# Patient Record
Sex: MALE | Race: AMERICAN INDIAN OR ALASKA NATIVE | NOT HISPANIC OR LATINO | Employment: UNEMPLOYED | URBAN - METROPOLITAN AREA
[De-identification: names, ages, dates, MRNs, and addresses within clinical notes are randomized per-mention and may not be internally consistent; named-entity substitution may affect disease eponyms.]

---

## 2021-09-02 ENCOUNTER — HOSPITAL ENCOUNTER (EMERGENCY)
Facility: HOSPITAL | Age: 51
Discharge: HOME | End: 2021-09-02
Attending: SURGERY

## 2021-09-02 DIAGNOSIS — L03.115 CELLULITIS OF RIGHT LOWER EXTREMITY: Primary | ICD-10-CM

## 2021-09-02 PROCEDURE — 99281 EMR DPT VST MAYX REQ PHY/QHP: CPT

## 2024-01-19 ENCOUNTER — APPOINTMENT (OUTPATIENT)
Dept: RADIOLOGY | Facility: CLINIC | Age: 54
End: 2024-01-19
Attending: STUDENT IN AN ORGANIZED HEALTH CARE EDUCATION/TRAINING PROGRAM
Payer: MEDICAID

## 2024-01-19 ENCOUNTER — OFFICE VISIT (OUTPATIENT)
Dept: FAMILY MEDICINE | Facility: CLINIC | Age: 54
End: 2024-01-19
Payer: MEDICAID

## 2024-01-19 VITALS
DIASTOLIC BLOOD PRESSURE: 64 MMHG | BODY MASS INDEX: 29.71 KG/M2 | RESPIRATION RATE: 16 BRPM | HEIGHT: 74 IN | HEART RATE: 80 BPM | OXYGEN SATURATION: 97 % | SYSTOLIC BLOOD PRESSURE: 122 MMHG | WEIGHT: 231.5 LBS

## 2024-01-19 DIAGNOSIS — Z11.59 ENCOUNTER FOR HEPATITIS C SCREENING TEST FOR LOW RISK PATIENT: ICD-10-CM

## 2024-01-19 DIAGNOSIS — G89.29 CHRONIC PAIN OF LEFT KNEE: ICD-10-CM

## 2024-01-19 DIAGNOSIS — Z13.1 DIABETES MELLITUS SCREENING: ICD-10-CM

## 2024-01-19 DIAGNOSIS — Z13.6 ENCOUNTER FOR LIPID SCREENING FOR CARDIOVASCULAR DISEASE: ICD-10-CM

## 2024-01-19 DIAGNOSIS — Z23 NEED FOR DIPHTHERIA-TETANUS-PERTUSSIS (TDAP) VACCINE: ICD-10-CM

## 2024-01-19 DIAGNOSIS — M25.562 CHRONIC PAIN OF LEFT KNEE: ICD-10-CM

## 2024-01-19 DIAGNOSIS — Z76.89 ENCOUNTER TO ESTABLISH CARE: Primary | ICD-10-CM

## 2024-01-19 DIAGNOSIS — Z12.5 SCREENING PSA (PROSTATE SPECIFIC ANTIGEN): ICD-10-CM

## 2024-01-19 DIAGNOSIS — Z12.11 COLON CANCER SCREENING: ICD-10-CM

## 2024-01-19 DIAGNOSIS — Z80.0 FAMILY HISTORY OF COLON CANCER IN MOTHER: ICD-10-CM

## 2024-01-19 DIAGNOSIS — E66.3 OVERWEIGHT (BMI 25.0-29.9): ICD-10-CM

## 2024-01-19 DIAGNOSIS — Z13.220 ENCOUNTER FOR LIPID SCREENING FOR CARDIOVASCULAR DISEASE: ICD-10-CM

## 2024-01-19 DIAGNOSIS — Z13.29 THYROID DISORDER SCREEN: ICD-10-CM

## 2024-01-19 DIAGNOSIS — Z00.00 ENCOUNTER FOR SCREENING AND PREVENTATIVE CARE: ICD-10-CM

## 2024-01-19 PROCEDURE — 3008F BODY MASS INDEX DOCD: CPT | Mod: CPTII,,, | Performed by: STUDENT IN AN ORGANIZED HEALTH CARE EDUCATION/TRAINING PROGRAM

## 2024-01-19 PROCEDURE — 73560 X-RAY EXAM OF KNEE 1 OR 2: CPT | Mod: TC,PO,LT

## 2024-01-19 PROCEDURE — 1159F MED LIST DOCD IN RCRD: CPT | Mod: CPTII,,, | Performed by: STUDENT IN AN ORGANIZED HEALTH CARE EDUCATION/TRAINING PROGRAM

## 2024-01-19 PROCEDURE — 1160F RVW MEDS BY RX/DR IN RCRD: CPT | Mod: CPTII,,, | Performed by: STUDENT IN AN ORGANIZED HEALTH CARE EDUCATION/TRAINING PROGRAM

## 2024-01-19 PROCEDURE — 3078F DIAST BP <80 MM HG: CPT | Mod: CPTII,,, | Performed by: STUDENT IN AN ORGANIZED HEALTH CARE EDUCATION/TRAINING PROGRAM

## 2024-01-19 PROCEDURE — 3074F SYST BP LT 130 MM HG: CPT | Mod: CPTII,,, | Performed by: STUDENT IN AN ORGANIZED HEALTH CARE EDUCATION/TRAINING PROGRAM

## 2024-01-19 PROCEDURE — 99999 PR PBB SHADOW E&M-EST. PATIENT-LVL III: CPT | Mod: PBBFAC,,, | Performed by: STUDENT IN AN ORGANIZED HEALTH CARE EDUCATION/TRAINING PROGRAM

## 2024-01-19 PROCEDURE — 90715 TDAP VACCINE 7 YRS/> IM: CPT | Mod: PBBFAC | Performed by: STUDENT IN AN ORGANIZED HEALTH CARE EDUCATION/TRAINING PROGRAM

## 2024-01-19 PROCEDURE — 99213 OFFICE O/P EST LOW 20 MIN: CPT | Mod: PBBFAC | Performed by: STUDENT IN AN ORGANIZED HEALTH CARE EDUCATION/TRAINING PROGRAM

## 2024-01-19 PROCEDURE — 99205 OFFICE O/P NEW HI 60 MIN: CPT | Mod: S$PBB,25,, | Performed by: STUDENT IN AN ORGANIZED HEALTH CARE EDUCATION/TRAINING PROGRAM

## 2024-01-19 PROCEDURE — 90471 IMMUNIZATION ADMIN: CPT | Mod: ,,, | Performed by: STUDENT IN AN ORGANIZED HEALTH CARE EDUCATION/TRAINING PROGRAM

## 2024-01-19 PROCEDURE — 99999PBSHW TDAP VACCINE GREATER THAN OR EQUAL TO 7YO IM: Mod: PBBFAC,,,

## 2024-01-19 PROCEDURE — 90715 TDAP VACCINE 7 YRS/> IM: CPT | Mod: ,,, | Performed by: STUDENT IN AN ORGANIZED HEALTH CARE EDUCATION/TRAINING PROGRAM

## 2024-01-19 PROCEDURE — 73560 X-RAY EXAM OF KNEE 1 OR 2: CPT | Mod: 26,LT,, | Performed by: RADIOLOGY

## 2024-01-19 RX ORDER — DICLOFENAC SODIUM 75 MG/1
75 TABLET, DELAYED RELEASE ORAL 2 TIMES DAILY PRN
Qty: 60 TABLET | Refills: 2 | Status: SHIPPED | OUTPATIENT
Start: 2024-01-19 | End: 2024-04-16

## 2024-01-22 ENCOUNTER — CLINICAL SUPPORT (OUTPATIENT)
Dept: FAMILY MEDICINE | Facility: CLINIC | Age: 54
End: 2024-01-22
Payer: MEDICAID

## 2024-01-22 DIAGNOSIS — Z13.6 ENCOUNTER FOR LIPID SCREENING FOR CARDIOVASCULAR DISEASE: ICD-10-CM

## 2024-01-22 DIAGNOSIS — Z13.29 THYROID DISORDER SCREEN: ICD-10-CM

## 2024-01-22 DIAGNOSIS — Z13.220 ENCOUNTER FOR LIPID SCREENING FOR CARDIOVASCULAR DISEASE: ICD-10-CM

## 2024-01-22 DIAGNOSIS — Z12.5 SCREENING PSA (PROSTATE SPECIFIC ANTIGEN): ICD-10-CM

## 2024-01-22 DIAGNOSIS — Z00.00 ENCOUNTER FOR SCREENING AND PREVENTATIVE CARE: ICD-10-CM

## 2024-01-22 DIAGNOSIS — Z11.59 ENCOUNTER FOR HEPATITIS C SCREENING TEST FOR LOW RISK PATIENT: ICD-10-CM

## 2024-01-22 DIAGNOSIS — Z13.1 DIABETES MELLITUS SCREENING: ICD-10-CM

## 2024-01-22 LAB
ALBUMIN SERPL BCP-MCNC: 3.9 G/DL (ref 3.5–5.2)
ALP SERPL-CCNC: 52 U/L (ref 55–135)
ALT SERPL W/O P-5'-P-CCNC: 15 U/L (ref 10–44)
ANION GAP SERPL CALC-SCNC: 7 MMOL/L (ref 8–16)
AST SERPL-CCNC: 15 U/L (ref 10–40)
BASOPHILS # BLD AUTO: 0.04 K/UL (ref 0–0.2)
BASOPHILS NFR BLD: 0.6 % (ref 0–1.9)
BILIRUB SERPL-MCNC: 1.5 MG/DL (ref 0.1–1)
BUN SERPL-MCNC: 16 MG/DL (ref 6–20)
CALCIUM SERPL-MCNC: 9.2 MG/DL (ref 8.7–10.5)
CHLORIDE SERPL-SCNC: 109 MMOL/L (ref 95–110)
CHOLEST SERPL-MCNC: 127 MG/DL (ref 120–199)
CHOLEST/HDLC SERPL: 2.7 {RATIO} (ref 2–5)
CO2 SERPL-SCNC: 27 MMOL/L (ref 23–29)
COMPLEXED PSA SERPL-MCNC: 0.14 NG/ML (ref 0–4)
CREAT SERPL-MCNC: 0.9 MG/DL (ref 0.5–1.4)
DIFFERENTIAL METHOD BLD: ABNORMAL
EOSINOPHIL # BLD AUTO: 0.9 K/UL (ref 0–0.5)
EOSINOPHIL NFR BLD: 13.7 % (ref 0–8)
ERYTHROCYTE [DISTWIDTH] IN BLOOD BY AUTOMATED COUNT: 12.9 % (ref 11.5–14.5)
EST. GFR  (NO RACE VARIABLE): >60 ML/MIN/1.73 M^2
ESTIMATED AVG GLUCOSE: 108 MG/DL (ref 68–131)
GLUCOSE SERPL-MCNC: 119 MG/DL (ref 70–110)
HBA1C MFR BLD: 5.4 % (ref 4–5.6)
HCT VFR BLD AUTO: 45.2 % (ref 40–54)
HDLC SERPL-MCNC: 47 MG/DL (ref 40–75)
HDLC SERPL: 37 % (ref 20–50)
HGB BLD-MCNC: 14.6 G/DL (ref 14–18)
IMM GRANULOCYTES # BLD AUTO: 0.01 K/UL (ref 0–0.04)
IMM GRANULOCYTES NFR BLD AUTO: 0.2 % (ref 0–0.5)
LDLC SERPL CALC-MCNC: 72.2 MG/DL (ref 63–159)
LYMPHOCYTES # BLD AUTO: 1.5 K/UL (ref 1–4.8)
LYMPHOCYTES NFR BLD: 23.3 % (ref 18–48)
MCH RBC QN AUTO: 29.3 PG (ref 27–31)
MCHC RBC AUTO-ENTMCNC: 32.3 G/DL (ref 32–36)
MCV RBC AUTO: 91 FL (ref 82–98)
MONOCYTES # BLD AUTO: 0.4 K/UL (ref 0.3–1)
MONOCYTES NFR BLD: 6.7 % (ref 4–15)
NEUTROPHILS # BLD AUTO: 3.6 K/UL (ref 1.8–7.7)
NEUTROPHILS NFR BLD: 55.5 % (ref 38–73)
NONHDLC SERPL-MCNC: 80 MG/DL
NRBC BLD-RTO: 0 /100 WBC
PLATELET # BLD AUTO: 187 K/UL (ref 150–450)
PMV BLD AUTO: 12.5 FL (ref 9.2–12.9)
POTASSIUM SERPL-SCNC: 4.4 MMOL/L (ref 3.5–5.1)
PROT SERPL-MCNC: 6.8 G/DL (ref 6–8.4)
RBC # BLD AUTO: 4.99 M/UL (ref 4.6–6.2)
SODIUM SERPL-SCNC: 143 MMOL/L (ref 136–145)
TRIGL SERPL-MCNC: 39 MG/DL (ref 30–150)
TSH SERPL DL<=0.005 MIU/L-ACNC: 0.67 UIU/ML (ref 0.4–4)
WBC # BLD AUTO: 6.56 K/UL (ref 3.9–12.7)

## 2024-01-22 PROCEDURE — 99999PBSHW PR PBB SHADOW TECHNICAL ONLY FILED TO HB: Mod: PBBFAC,,,

## 2024-01-22 PROCEDURE — 80053 COMPREHEN METABOLIC PANEL: CPT | Performed by: STUDENT IN AN ORGANIZED HEALTH CARE EDUCATION/TRAINING PROGRAM

## 2024-01-22 PROCEDURE — 86803 HEPATITIS C AB TEST: CPT | Performed by: STUDENT IN AN ORGANIZED HEALTH CARE EDUCATION/TRAINING PROGRAM

## 2024-01-22 PROCEDURE — 85025 COMPLETE CBC W/AUTO DIFF WBC: CPT | Performed by: STUDENT IN AN ORGANIZED HEALTH CARE EDUCATION/TRAINING PROGRAM

## 2024-01-22 PROCEDURE — 36415 COLL VENOUS BLD VENIPUNCTURE: CPT | Mod: PBBFAC

## 2024-01-22 PROCEDURE — 83036 HEMOGLOBIN GLYCOSYLATED A1C: CPT | Performed by: STUDENT IN AN ORGANIZED HEALTH CARE EDUCATION/TRAINING PROGRAM

## 2024-01-22 PROCEDURE — 84443 ASSAY THYROID STIM HORMONE: CPT | Performed by: STUDENT IN AN ORGANIZED HEALTH CARE EDUCATION/TRAINING PROGRAM

## 2024-01-22 PROCEDURE — 84153 ASSAY OF PSA TOTAL: CPT | Performed by: STUDENT IN AN ORGANIZED HEALTH CARE EDUCATION/TRAINING PROGRAM

## 2024-01-22 PROCEDURE — 80061 LIPID PANEL: CPT | Performed by: STUDENT IN AN ORGANIZED HEALTH CARE EDUCATION/TRAINING PROGRAM

## 2024-01-23 ENCOUNTER — CLINICAL SUPPORT (OUTPATIENT)
Dept: REHABILITATION | Facility: HOSPITAL | Age: 54
End: 2024-01-23
Attending: STUDENT IN AN ORGANIZED HEALTH CARE EDUCATION/TRAINING PROGRAM
Payer: MEDICAID

## 2024-01-23 DIAGNOSIS — M25.562 CHRONIC PAIN OF LEFT KNEE: ICD-10-CM

## 2024-01-23 DIAGNOSIS — G89.29 CHRONIC PAIN OF LEFT KNEE: ICD-10-CM

## 2024-01-23 LAB — HCV AB SERPL QL IA: NORMAL

## 2024-01-23 PROCEDURE — 97110 THERAPEUTIC EXERCISES: CPT | Mod: PN

## 2024-01-23 PROCEDURE — 97161 PT EVAL LOW COMPLEX 20 MIN: CPT | Mod: PN

## 2024-01-23 NOTE — PLAN OF CARE
[facility]   Physical Therapy Initial Evaluation     Date: 1/23/2024   Name: Gera Jha  Clinic Number: [id number]    Therapy Diagnosis:   Encounter Diagnosis   Name Primary?    Chronic pain of left knee      Physician: Philip Lewis MD    Physician Orders: PT Eval and Treat   Medical Diagnosis from Referral:   Diagnosis   M25.562,G89.29 (ICD-10-CM) - Chronic pain of left knee     Evaluation Date: 1/23/2024  Authorization Period Expiration: 1/18/2025  Plan of Care Expiration: 2/20/2024  Progress Note Due: 2/6/2024  Visit # / Visits authorized: 1/ 1   FOTO: 1/3    Precautions: Standard     Time In: 1030  Time Out: 1110  Total Appointment Time (timed & untimed codes): 40 minutes      SUBJECTIVE     Date of onset: chronic pain    History of current condition - Gera reports: he worked heavy labor jobs including construction and . Patient reports that he had a fall in 2004 which started his pain in his left knee. Patient does not work at this time secondary to pain and inability to perform jobs.    Falls: 2004    Imaging, bone scan films:   FINDINGS:  Tricompartmental degenerative changes, most pronounced in the medial patellofemoral compartment, with joint space narrowing, subchondral sclerosis and marginal osteophytosis.  There is dorsal spurring from the patella.  Suspected loose body within the anterior joint space measuring 1.7 cm.  Small joint effusion.No fracture or dislocation.     Impression:     As above.       Prior Therapy: none  Social History:  lives alone  Occupation: unemployed  Prior Level of Function: Independent   Current Level of Function: independent with difficulty    Pain:  Current 2/10, worst 8/10, best 0/10   Location: left knee    Description: Sharp and Shooting  Aggravating Factors: Walking  Easing Factors: heating pad    Patients goals: Patient to return to prior level of function with decreased pain.      Medical History:   No past medical  history on file.    Surgical History:   Gera Jha  has no past surgical history on file.    Medications:   Gera has a current medication list which includes the following prescription(s): diclofenac.    Allergies:   Review of patient's allergies indicates:   Allergen Reactions    First aid ointment w/pramoxin [llypo-vjaci-rhsgyfr-pramoxine]     Penicillins         OBJECTIVE     LOWER EXTREMITY AROM    LEFT RIGHT   Knee Flexion 110 WNL   Knee Extension -10 WNL   LOWER EXTREMITY PROM   Knee Flexion 115 WNL   Knee Extension -8 WNL       LOWER EXTREMITY STRENGTH    LEFT RIGHT   Knee Extension 4/5 4+/5   Knee Flexion 4-/5 4/5   Hip Flexion 4/5 4/5   Hip Abduction 4-/5 4-/5   Hip Extension 4/5 4/5     FLEXIBILITY   Hamstring  Mild restriction       DERMATOMES: Sensation: Light Touch: Intact  MYOTOMES: normal    SPECIAL TESTS    LEFT RIGHT   Obers -    Parveen +    Thessaly -    Jointline Tenderness -    Hip Scour -    Hip BG -      PALPATION:  Patient reports primary pain region along peripatella region    SQUAT: The pt demonstrates bilateral knee valgus with anterior tibial translation noted.     PATELLAR TRACKING: hypomobility in patella in all directions       Limitation/Restriction for FOTO Knee Survey    Therapist reviewed FOTO scores for Gera Jha on 1/23/2024.   FOTO documents entered into [employer] - see Media section.    Limitation Score: 50%       TREATMENT     Total Treatment time (time-based codes) separate from Evaluation: 20 minutes      Gera received the treatments listed below:      therapeutic exercises to develop strength and ROM for 20 minutes including:  2 x 10 short arc quad  2 x 10 clam shells with red theraband   2 x 10 long arc quad 3#  2 x 10 hamstring curls with red theraband     manual therapy techniques: Joint mobilizations were applied to the: left knee for 0 minutes, including:      neuromuscular re-education activities to improve:  for  minutes. The following activities were  included:      therapeutic activities to improve functional performance for  10 minutes, includin' recumbent bike  2 x 10 step ups on 4 in step  5 x 10 seconds gastroc stretch    gait training to improve functional mobility and safety for   minutes, including:      direct contact modalities after being cleared for contraindications:     supervised modalities after being cleared for contradictions:     hot pack for  minutes to .    cold pack for  minutes to .      PATIENT EDUCATION AND HOME EXERCISES     Education provided:   - Patient to work on ROM in left knee   - Pt/family was provided educational information, including: role of PT, goals for PT, scheduling - pt verbalized understanding. Discussed insurance limitations with pt.     Written Home Exercises Provided: yes. Exercises were reviewed and Gera was able to demonstrate them prior to the end of the session.  Gera demonstrated good  understanding of the education provided. See EMR under Patient Instructions for exercises provided during therapy sessions.    ASSESSMENT     Gera is a 53 y.o. male referred to outpatient Physical Therapy with a medical diagnosis of   Diagnosis   M25.562,G89.29 (ICD-10-CM) - Chronic pain of left knee   . Patient presents with decreased ROM in left knee, left knee strength, flexibility and endurance. Patient would benefit from skilled physical therapy to address the above mentioned deficits.    -Decreased function (LEFS)  -Increased pain (NPS)   -Decreased pain free knee AROM  -Decreased LE strength (MMT)   -Decreased LE mobility   -Decreased participation in regular exercise routine  -(+) TTP    Intervention strategies designed to address these impairments will be instrumental in achieving the stated functional goals, including her ability to safely perform mobility tasks. Based on the examination, the patient's rehabilitation potential to achieve functional goals is good.    Patient prognosis is Good.   Patient will benefit  from skilled outpatient Physical Therapy to address the deficits stated above and in the chart below, provide patient /family education, and to maximize patientt's level of independence.     Plan of care discussed with patient: Yes  Patient's spiritual, cultural and educational needs considered and patient is agreeable to the plan of care and goals as stated below:     Anticipated Barriers for therapy: none    Medical Necessity is demonstrated by the following  History  Co-morbidities and personal factors that may impact the plan of care Co-morbidities:   none    Personal Factors:   no deficits     low   Examination  Body Structures and Functions, activity limitations and participation restrictions that may impact the plan of care Body Regions:   lower extremities    Body Systems:    ROM  strength    Participation Restrictions:   none         low   Clinical Presentation stable and uncomplicated low   Decision Making/ Complexity Score: low     Goals:  Short Term Goals: 2 weeks   1. Patient demonstrates independence with HEP.   2. Patient to improve left knee extension by 5 degrees.    Long Term Goals: 4 weeks   1. Patient demonstrates increased ROM to WNL to improve tolerance to functional activities.   2. Patient demonstrates increased strength BLE's to 4+/5 or greater to improve tolerance to functional activities.   3. Patient demonstrates improved overall function per KOOS to 59% or less.     PLAN   Plan of care Certification: 1/23/2024 to 2/20/2024.    Outpatient Physical Therapy 2 times weekly for 4 weeks to include the following interventions: Electrical Stimulation  , Manual Therapy, Neuromuscular Re-ed, Patient Education, Therapeutic Activities, and Therapeutic Exercise.     Suzy Conway, PT    Pt may be seen by PTA as part of the rehabilitation team.     Therapist: Suzy Conway, PT  1/23/2024    I CERTIFY THE NEED FOR THESE SERVICES FURNISHED UNDER THIS PLAN OF TREATMENT AND WHILE UNDER MY CARE   Physician's  comments:     Physician's Signature: ___________________________________________________

## 2024-01-26 ENCOUNTER — CLINICAL SUPPORT (OUTPATIENT)
Dept: REHABILITATION | Facility: HOSPITAL | Age: 54
End: 2024-01-26
Attending: STUDENT IN AN ORGANIZED HEALTH CARE EDUCATION/TRAINING PROGRAM
Payer: MEDICAID

## 2024-01-26 DIAGNOSIS — M25.562 CHRONIC PAIN OF LEFT KNEE: Primary | ICD-10-CM

## 2024-01-26 DIAGNOSIS — G89.29 CHRONIC PAIN OF LEFT KNEE: Primary | ICD-10-CM

## 2024-01-26 PROCEDURE — 97110 THERAPEUTIC EXERCISES: CPT | Mod: PN

## 2024-01-26 NOTE — PROGRESS NOTES
[facility]   Physical Therapy Treatment Note      Name: Gera Jha  Clinic Number: [id number]    Therapy Diagnosis:   Encounter Diagnosis   Name Primary?    Chronic pain of left knee Yes     Physician: Philip Lewis MD    Visit Date: 2024    Evaluation Date: 2024  Authorization Period Expiration: 2025  Plan of Care Expiration: 2024  Progress Note Due: 2024  Visit # / Visits authorized:    FOTO: 1/3     Precautions: Standard      Time In: 1030  Time Out: 1110  Total Appointment Time (timed & untimed codes): 40 minutes    PTA Visit #: -/5       Subjective     Patient reports: that he was a little sore from his first visit.  He was compliant with home exercise program.  Response to previous treatment: positive  Functional change: none    Pain: 4/10  Location: left knee      Objective      Objective Measures updated at progress report unless specified.     Treatment     Gera received the treatments listed below:      therapeutic exercises to develop strength, endurance, and ROM for 25 minutes including:  2 x 10 short arc quad  2 x 10 clam shells with red theraband   2 x 10 long arc quad 3#  2 x 10 hamstring curls with red theraband   3 x 10 shuttle squats with 37 lbs  3 x 10 heel raises 37#  manual therapy techniques:  were applied to the:  for  minutes, including:      neuromuscular re-education activities to improve:  for  minutes. The following activities were included:      therapeutic activities to improve functional performance for   minutes, includin' recumbent bike  2 x 10 step ups on 4 in step  5 x 10 seconds gastroc stretch    gait training to improve functional mobility and safety for   minutes, including:      direct contact modalities after being cleared for contraindications:     supervised modalities after being cleared for contradictions:     hot pack for  minutes to .    cold pack for 5 minutes to left knee.    Patient Education and  Home Exercises       Education provided:   - none    Written Home Exercises Provided: Patient instructed to cont prior HEP. Exercises were reviewed and Gera was able to demonstrate them prior to the end of the session.  Gera demonstrated good  understanding of the education provided. See Electronic Medical Record under Patient Instructions for exercises provided during therapy sessions    Assessment     Patient tolerated today's treatment well with no increased reports of pain or discomfort. Patient would benefit from continued flexibility and improve ROM in left knee.    Gera Is progressing well towards his goals.   Patient prognosis is Good.     Patient will continue to benefit from skilled outpatient physical therapy to address the deficits listed in the problem list box on initial evaluation, provide pt/family education and to maximize pt's level of independence in the home and community environment.     Patient's spiritual, cultural and educational needs considered and pt agreeable to plan of care and goals.     Anticipated barriers to physical therapy: none    Goals: Short Term Goals: 2 weeks   1. Patient demonstrates independence with HEP.   2. Patient to improve left knee extension by 5 degrees.     Long Term Goals: 4 weeks   1. Patient demonstrates increased ROM to WNL to improve tolerance to functional activities.   2. Patient demonstrates increased strength BLE's to 4+/5 or greater to improve tolerance to functional activities.   3. Patient demonstrates improved overall function per KOOS to 59% or less.     Plan     Plan of care Certification: 1/23/2024 to 2/20/2024.     Outpatient Physical Therapy 2 times weekly for 4 weeks to include the following interventions: Electrical Stimulation  , Manual Therapy, Neuromuscular Re-ed, Patient Education, Therapeutic Activities, and Therapeutic Exercise.      Pt may be seen by PTA as part of the rehabilitation team.        Suzy Conway, PT

## 2024-01-30 ENCOUNTER — CLINICAL SUPPORT (OUTPATIENT)
Dept: REHABILITATION | Facility: HOSPITAL | Age: 54
End: 2024-01-30
Attending: STUDENT IN AN ORGANIZED HEALTH CARE EDUCATION/TRAINING PROGRAM
Payer: MEDICAID

## 2024-01-30 DIAGNOSIS — M25.562 CHRONIC PAIN OF LEFT KNEE: Primary | ICD-10-CM

## 2024-01-30 DIAGNOSIS — G89.29 CHRONIC PAIN OF LEFT KNEE: Primary | ICD-10-CM

## 2024-01-30 PROCEDURE — 97110 THERAPEUTIC EXERCISES: CPT | Mod: PN

## 2024-01-30 NOTE — PROGRESS NOTES
[facility]   Physical Therapy Treatment Note      Name: Gera Jha  Clinic Number: [id number]    Therapy Diagnosis:   Encounter Diagnosis   Name Primary?    Chronic pain of left knee Yes     Physician: Philip Lewis MD    Visit Date: 2024    Evaluation Date: 2024  Authorization Period Expiration: 2025  Plan of Care Expiration: 2024  Progress Note Due: 2024  Visit # / Visits authorized:   FOTO: 1/3     Precautions: Standard      Time In: 1030  Time Out: 1110  Total Appointment Time (timed & untimed codes): 40 minutes    PTA Visit #: -/5       Subjective     Patient reports: that he was a little sore from his first visit.  He was compliant with home exercise program.  Response to previous treatment: positive  Functional change: none    Pain: 4/10  Location: left knee      Objective      Objective Measures updated at progress report unless specified.     Treatment     Gera received the treatments listed below:      therapeutic exercises to develop strength, endurance, and ROM for 25 minutes including:  2 x 10 short arc quad  2 x 10 clam shells with red theraband   2 x 10 long arc quad 3#  2 x 10 hamstring curls with red theraband   3 x 10 shuttle squats with 37 lbs  3 x 10 heel raises 37#  2 x 10 red theraband extension and abduction  2 x 10 terminal knee extension with red theraband   3 x 30 seconds balance on blue airex  manual therapy techniques:  were applied to the:  for  minutes, including:      neuromuscular re-education activities to improve:  for  minutes. The following activities were included:      therapeutic activities to improve functional performance for   minutes, includin' recumbent bike  2 x 10 step ups on 4 in step  5 x 10 seconds gastroc stretch    gait training to improve functional mobility and safety for   minutes, including:      direct contact modalities after being cleared for contraindications:     supervised modalities  after being cleared for contradictions:     hot pack for  minutes to .    cold pack for 5 minutes to left knee.    Patient Education and Home Exercises       Education provided:   - none    Written Home Exercises Provided: Patient instructed to cont prior HEP. Exercises were reviewed and Gera was able to demonstrate them prior to the end of the session.  Egra demonstrated good  understanding of the education provided. See Electronic Medical Record under Patient Instructions for exercises provided during therapy sessions    Assessment     Patient tolerated today's treatment well with no increased reports of pain or discomfort. Patient would benefit from continued flexibility and improve ROM in left knee.    Gera Is progressing well towards his goals.   Patient prognosis is Good.     Patient will continue to benefit from skilled outpatient physical therapy to address the deficits listed in the problem list box on initial evaluation, provide pt/family education and to maximize pt's level of independence in the home and community environment.     Patient's spiritual, cultural and educational needs considered and pt agreeable to plan of care and goals.     Anticipated barriers to physical therapy: none    Goals: Short Term Goals: 2 weeks   1. Patient demonstrates independence with HEP.   2. Patient to improve left knee extension by 5 degrees.     Long Term Goals: 4 weeks   1. Patient demonstrates increased ROM to WNL to improve tolerance to functional activities.   2. Patient demonstrates increased strength BLE's to 4+/5 or greater to improve tolerance to functional activities.   3. Patient demonstrates improved overall function per KOOS to 59% or less.     Plan     Plan of care Certification: 1/23/2024 to 2/20/2024.     Outpatient Physical Therapy 2 times weekly for 4 weeks to include the following interventions: Electrical Stimulation  , Manual Therapy, Neuromuscular Re-ed, Patient Education, Therapeutic Activities,  and Therapeutic Exercise.      Pt may be seen by PTA as part of the rehabilitation team.        Suzy Conway, PT

## 2024-02-01 ENCOUNTER — CLINICAL SUPPORT (OUTPATIENT)
Dept: REHABILITATION | Facility: HOSPITAL | Age: 54
End: 2024-02-01
Attending: STUDENT IN AN ORGANIZED HEALTH CARE EDUCATION/TRAINING PROGRAM
Payer: MEDICAID

## 2024-02-01 DIAGNOSIS — G89.29 CHRONIC PAIN OF LEFT KNEE: Primary | ICD-10-CM

## 2024-02-01 DIAGNOSIS — M25.562 CHRONIC PAIN OF LEFT KNEE: Primary | ICD-10-CM

## 2024-02-01 PROCEDURE — 97110 THERAPEUTIC EXERCISES: CPT | Mod: PN

## 2024-02-01 NOTE — PROGRESS NOTES
[facility]   Physical Therapy Treatment Note      Name: Gera Jha  Clinic Number: [id number]    Therapy Diagnosis:   Encounter Diagnosis   Name Primary?    Chronic pain of left knee Yes     Physician: Philip Lewis MD    Visit Date: 2024    Evaluation Date: 2024  Authorization Period Expiration: 2025  Plan of Care Expiration: 2024  Progress Note Due: 2024  Visit # / Visits authorized:   FOTO: 1/3     Precautions: Standard      Time In: 1100  Time Out: 1140  Total Appointment Time (timed & untimed codes): 40 minutes    PTA Visit #: -/5       Subjective     Patient reports: that he has been feeling really good. No new c/o.   He was compliant with home exercise program.  Response to previous treatment: positive  Functional change: none    Pain: 4/10  Location: left knee      Objective      Objective Measures updated at progress report unless specified.     Treatment     Gera received the treatments listed below:      therapeutic exercises to develop strength, endurance, and ROM for 25 minutes including:    3 x 10 shuttle squats with 42 lbs  3 x 10 heel raises 42#  2 x 10 red theraband extension and abduction  2 x 10 terminal knee extension with red theraband   3 x 30 seconds balance on blue airex  2' sidestepping with green theraband     Not performed:  2 x 10 short arc quad  2 x 10 clam shells with red theraband   2 x 10 long arc quad 3#  2 x 10 hamstring curls with red theraband   manual therapy techniques:  were applied to the:  for  minutes, including:      neuromuscular re-education activities to improve:  for  minutes. The following activities were included:      therapeutic activities to improve functional performance for   minutes, includin' recumbent bike Level 4  2 x 10 step ups on 4 in step  2 x 10 lateral step ups 4 in step  5 x 10 seconds gastroc stretch    gait training to improve functional mobility and safety for   minutes,  including:      direct contact modalities after being cleared for contraindications:     supervised modalities after being cleared for contradictions:     hot pack for  minutes to .    cold pack for 0 minutes to left knee.    Patient Education and Home Exercises       Education provided:   - none    Written Home Exercises Provided: Patient instructed to cont prior HEP. Exercises were reviewed and Gera was able to demonstrate them prior to the end of the session.  Gera demonstrated good  understanding of the education provided. See Electronic Medical Record under Patient Instructions for exercises provided during therapy sessions    Assessment     Patient tolerated today's treatment well with no increased reports of pain or discomfort. Patient would benefit from continued flexibility and improve ROM in left knee.    Gera Is progressing well towards his goals.   Patient prognosis is Good.     Patient will continue to benefit from skilled outpatient physical therapy to address the deficits listed in the problem list box on initial evaluation, provide pt/family education and to maximize pt's level of independence in the home and community environment.     Patient's spiritual, cultural and educational needs considered and pt agreeable to plan of care and goals.     Anticipated barriers to physical therapy: none    Goals: Short Term Goals: 2 weeks   1. Patient demonstrates independence with HEP.   2. Patient to improve left knee extension by 5 degrees.     Long Term Goals: 4 weeks   1. Patient demonstrates increased ROM to WNL to improve tolerance to functional activities.   2. Patient demonstrates increased strength BLE's to 4+/5 or greater to improve tolerance to functional activities.   3. Patient demonstrates improved overall function per KOOS to 59% or less.     Plan     Plan of care Certification: 1/23/2024 to 2/20/2024.     Outpatient Physical Therapy 2 times weekly for 4 weeks to include the following  interventions: Electrical Stimulation  , Manual Therapy, Neuromuscular Re-ed, Patient Education, Therapeutic Activities, and Therapeutic Exercise.      Pt may be seen by PTA as part of the rehabilitation team.        Suzy Conway, PT

## 2024-02-06 ENCOUNTER — CLINICAL SUPPORT (OUTPATIENT)
Dept: REHABILITATION | Facility: HOSPITAL | Age: 54
End: 2024-02-06
Attending: STUDENT IN AN ORGANIZED HEALTH CARE EDUCATION/TRAINING PROGRAM
Payer: MEDICAID

## 2024-02-06 DIAGNOSIS — G89.29 CHRONIC PAIN OF LEFT KNEE: Primary | ICD-10-CM

## 2024-02-06 DIAGNOSIS — M25.562 CHRONIC PAIN OF LEFT KNEE: Primary | ICD-10-CM

## 2024-02-06 PROCEDURE — 97110 THERAPEUTIC EXERCISES: CPT | Mod: PN

## 2024-02-06 NOTE — PROGRESS NOTES
[facility]   Physical Therapy Treatment Note      Name: Gera Jha  Clinic Number: [id number]    Therapy Diagnosis:   Encounter Diagnosis   Name Primary?    Chronic pain of left knee Yes     Physician: Philip Lewis MD    Visit Date: 2024    Evaluation Date: 2024  Authorization Period Expiration: 2025  Plan of Care Expiration: 2024  Progress Note Due: 2024  Visit # / Visits authorized:   FOTO: 1/3     Precautions: Standard      Time In: 1115  Time Out: 1155  Total Appointment Time (timed & untimed codes): 40 minutes    PTA Visit #: -/5       Subjective     Patient reports: that he overdid it at home. He cut grass and worked outside.  He was compliant with home exercise program.  Response to previous treatment: positive  Functional change: none    Pain: 4/10  Location: left knee      Objective      Objective Measures updated at progress report unless specified.     Treatment     Gera received the treatments listed below:      therapeutic exercises to develop strength, endurance, and ROM for 25 minutes including:    3 x 10 shuttle squats with 42 lbs  3 x 10 heel raises 42#  2 x 10 red theraband extension and abduction  2 x 10 terminal knee extension with red theraband   3 x 30 seconds balance on blue airex  2' sidestepping with green theraband     Not performed:  2 x 10 short arc quad  2 x 10 clam shells with red theraband   2 x 10 long arc quad 3#  2 x 10 hamstring curls with red theraband   manual therapy techniques:  were applied to the:  for  minutes, including:      neuromuscular re-education activities to improve:  for  minutes. The following activities were included:      therapeutic activities to improve functional performance for   minutes, includin' recumbent bike Level 4  2 x 10 step ups on bosu  2 x 10 lateral step ups 4 in step  5 x 10 seconds gastroc stretch    gait training to improve functional mobility and safety for   minutes,  including:      direct contact modalities after being cleared for contraindications:     supervised modalities after being cleared for contradictions:     hot pack for  minutes to .    cold pack for 0 minutes to left knee.    Patient Education and Home Exercises       Education provided:   - none    Written Home Exercises Provided: Patient instructed to cont prior HEP. Exercises were reviewed and Gera was able to demonstrate them prior to the end of the session.  Gera demonstrated good  understanding of the education provided. See Electronic Medical Record under Patient Instructions for exercises provided during therapy sessions    Assessment     Patient tolerated today's treatment well with no increased reports of pain or discomfort. Patient would benefit from continued flexibility and improve ROM in left knee.    Gera Is progressing well towards his goals.   Patient prognosis is Good.     Patient will continue to benefit from skilled outpatient physical therapy to address the deficits listed in the problem list box on initial evaluation, provide pt/family education and to maximize pt's level of independence in the home and community environment.     Patient's spiritual, cultural and educational needs considered and pt agreeable to plan of care and goals.     Anticipated barriers to physical therapy: none    Goals: Short Term Goals: 2 weeks   1. Patient demonstrates independence with HEP.   2. Patient to improve left knee extension by 5 degrees.     Long Term Goals: 4 weeks   1. Patient demonstrates increased ROM to WNL to improve tolerance to functional activities.   2. Patient demonstrates increased strength BLE's to 4+/5 or greater to improve tolerance to functional activities.   3. Patient demonstrates improved overall function per KOOS to 59% or less.     Plan     Plan of care Certification: 1/23/2024 to 2/20/2024.     Outpatient Physical Therapy 2 times weekly for 4 weeks to include the following  interventions: Electrical Stimulation  , Manual Therapy, Neuromuscular Re-ed, Patient Education, Therapeutic Activities, and Therapeutic Exercise.      Pt may be seen by PTA as part of the rehabilitation team.        Suzy Conway, PT

## 2024-02-09 ENCOUNTER — CLINICAL SUPPORT (OUTPATIENT)
Dept: REHABILITATION | Facility: HOSPITAL | Age: 54
End: 2024-02-09
Attending: STUDENT IN AN ORGANIZED HEALTH CARE EDUCATION/TRAINING PROGRAM
Payer: MEDICAID

## 2024-02-09 DIAGNOSIS — G89.29 CHRONIC PAIN OF LEFT KNEE: Primary | ICD-10-CM

## 2024-02-09 DIAGNOSIS — M25.562 CHRONIC PAIN OF LEFT KNEE: Primary | ICD-10-CM

## 2024-02-09 PROCEDURE — 97110 THERAPEUTIC EXERCISES: CPT | Mod: PN

## 2024-02-09 NOTE — PROGRESS NOTES
[facility]   Physical Therapy Treatment Note      Name: Gera Jha  Clinic Number: [id number]    Therapy Diagnosis:   Encounter Diagnosis   Name Primary?    Chronic pain of left knee Yes     Physician: No ref. provider found    Visit Date: 2024    Evaluation Date: 2024  Authorization Period Expiration: 2025  Plan of Care Expiration: 2024  Progress Note Due: 2024  Visit # / Visits authorized:   FOTO: 1/3     Precautions: Standard      Time In: 1055  Time Out: 1135  Total Appointment Time (timed & untimed codes): 40 minutes    PTA Visit #: -/5       Subjective     Patient reports: that he overdid it at home. He cut grass and worked outside.  He was compliant with home exercise program.  Response to previous treatment: positive  Functional change: none    Pain: 4/10  Location: left knee      Objective      Objective Measures updated at progress report unless specified.     Treatment     Gera received the treatments listed below:      therapeutic exercises to develop strength, endurance, and ROM for 25 minutes including:    3 x 10 shuttle squats with 42 lbs  3 x 10 heel raises 42#  2 x 10 red theraband extension and abduction  2 x 10 terminal knee extension with red theraband   3 x 30 seconds balance on blue airex  2' sidestepping with green theraband     Not performed:  2 x 10 short arc quad  2 x 10 clam shells with red theraband   2 x 10 long arc quad 3#  2 x 10 hamstring curls with red theraband   manual therapy techniques:  were applied to the:  for  minutes, including:      neuromuscular re-education activities to improve:  for  minutes. The following activities were included:      therapeutic activities to improve functional performance for   minutes, includin' recumbent bike Level 4  2 x 10 step ups on bosu  2 x 10 lateral step ups 4 in step  5 x 10 seconds gastroc stretch    gait training to improve functional mobility and safety for   minutes,  including:      direct contact modalities after being cleared for contraindications:     supervised modalities after being cleared for contradictions:     hot pack for  minutes to .    cold pack for 0 minutes to left knee.    Patient Education and Home Exercises       Education provided:   - none    Written Home Exercises Provided: Patient instructed to cont prior HEP. Exercises were reviewed and Gera was able to demonstrate them prior to the end of the session.  Gera demonstrated good  understanding of the education provided. See Electronic Medical Record under Patient Instructions for exercises provided during therapy sessions    Assessment     Patient tolerated today's treatment well with no increased reports of pain or discomfort. Patient would benefit from continued flexibility and improve ROM in left knee.    Gera Is progressing well towards his goals.   Patient prognosis is Good.     Patient will continue to benefit from skilled outpatient physical therapy to address the deficits listed in the problem list box on initial evaluation, provide pt/family education and to maximize pt's level of independence in the home and community environment.     Patient's spiritual, cultural and educational needs considered and pt agreeable to plan of care and goals.     Anticipated barriers to physical therapy: none    Goals: Short Term Goals: 2 weeks   1. Patient demonstrates independence with HEP.   2. Patient to improve left knee extension by 5 degrees.     Long Term Goals: 4 weeks   1. Patient demonstrates increased ROM to WNL to improve tolerance to functional activities.   2. Patient demonstrates increased strength BLE's to 4+/5 or greater to improve tolerance to functional activities.   3. Patient demonstrates improved overall function per KOOS to 59% or less.     Plan     Plan of care Certification: 1/23/2024 to 2/20/2024.     Outpatient Physical Therapy 2 times weekly for 4 weeks to include the following  interventions: Electrical Stimulation  , Manual Therapy, Neuromuscular Re-ed, Patient Education, Therapeutic Activities, and Therapeutic Exercise.      Pt may be seen by PTA as part of the rehabilitation team.        Suzy Conway, PT

## 2024-02-15 ENCOUNTER — HOSPITAL ENCOUNTER (OUTPATIENT)
Dept: PREADMISSION TESTING | Facility: HOSPITAL | Age: 54
Discharge: HOME | End: 2024-02-15
Attending: STUDENT IN AN ORGANIZED HEALTH CARE EDUCATION/TRAINING PROGRAM
Payer: MEDICAID

## 2024-02-15 NOTE — DISCHARGE INSTRUCTIONS
Colonoscopy Prep Instructions      Date: Thursday   Arrival time:       IMPORTANT: PLEASE READ YOUR INSTRUCTIONS CAREFULLY. FAILURE TO FOLLOW THESE INSTRUCTIONS MAY RESULT IN YOUR PROCEURE BEING             CANCELED,RESCHEDULED, OR REPEATED.            Clear Liquid Diet     The Wednesday before your procedure you will follow a clear liquid diet ALL day.     You may have any of the following:     Water, tea, coffee (decaffeinated or regular)     Soft drinks (regular or sugar free)     Gelatin dessert (plain or flavored)     Juice, Gatorade, Powerade, Crystal Lite, lemonade, limeade, Kodi-Aid     Bouillon, clear consommé, 100% fat free beef, chicken, or vegetable broths     Snowballs, popsicles     100% cranberry juice is the only red liquid allowed     Please Avoid the following:     Anything with RED dye     Liquids not specifically listed     Dairy (liquid and powder)     Creamers (liquid and powder)     Alcohol               CLENPIQ Instructions    PLEASE FOLLOW THESE INSTRUCTIONS NOT THE INSTRUCTIONS ON THE BOX    You are scheduled for a colonoscopy with Dr. West on 2/22/24 at [facility].  To ensure that your test is accurate and complete, you MUST follow these instructions listed below.  If you have any questions, please call our office at [phone].  Plan on being at the hospital for your procedure for 3-4 hours. Please contact the office at [phone] two days prior to procedure date if reschedule is needed.    Follow a CLEAR LIQUID DIET for the entire day before your scheduled colonoscopy.  This means no solid food the entire day starting when you wake.  You may have as much of the clear liquids as you want throughout the day.      1.  At 12:00 NOON, take 4 Dulcolax Laxatives with 16 ounces of water.  Typically, it can take 2-3 hours for the effects of this medication to start.    2.  AT 3 pm the evening before your colonoscopy, OPEN ONE (1) BOTTLE OF CLENPIQ AND DRINK THE ENTIRE BOTTLE.   DRINK FIVE (5) 8-OUNCE GLASSES OF WATER (40 OUNCES TOTAL) OVER THE NEXT FIVE (5) HOURS.     3.  The endoscopy department will call you 1 day before your colonoscopy to tell you the exact time to arrive, AND to tell you the exact time to drink the 2nd portion of your prep (which will be FIVE HOURS BEFORE YOUR ARRIVAL TIME).      At your estimated time of __________am:     OPEN THE OTHER ONE (1) BOTTLE OF CLENPIQ AND DRINK THE ENTIRE BOTTLE.  DRINK THREE (3) 8-OUNCE GLASSES OF WATER (24 OUNCES TOTAL) OVER THE NEXT THREE (3) HOURS.   Once this is complete, you can not have anything else by mouth!    4.  You must have someone with you to DRIVE YOU HOME since you will be receiving IV sedation for the colonoscopy.    5.  It is ok to take MOST of your REGULAR MEDICATIONS  in the morning of your test with a SIP of water.  THE ONLY MEDS YOU NEED TO HOLD ARE YOUR DIABETES MEDICATIONS,  SOME BLOOD PRESSURE MEDS, AND BLOOD THINNERS IF OK'D BY YOUR DOCTOR.  Do NOT have anything else to eat or drink the morning of your colonoscopy.  It is ok to brush your teeth.    6.  If you are on blood thinners THAT YOU HAVE BEEN INSTRUCTED TO HOLD BY YOUR DOCTOR FOR THIS PROCEDURE, then do NOT take this the morning of your colonoscopy.  Do NOT stop these medications on your own, they must be approved to be held by your doctor.  Your colonoscopy can NOT be done if you are on these medications.  Examples of blood thinners include: Coumadin, Aggrenox, Plavix, Pradaxa, Reapro, Pletal, Xarelto, Ticagrelor, Brilinta, Eliquis, and high dose aspirin (325 mg).  You do not have to stop baby aspirin 81 mg.    7.  IF YOU ARE DIABETIC:  NO INSULIN OR ORAL MEDICATIONS THE MORNING OF THE COLONOSCOPY.  TAKE ONLY HALF THE DOSE OF YOUR INSULIN THE DAY BEFORE THE COLONOSCOPY.  DO NOT TAKE ANY ORAL DIABETIC MEDICATIONS THE DAY BEFORE THE COLONOSCOPY.  IF YOU ARE AN INSULIN DEPENDENT DIABETIC WITH UNSTABLE BLOOD SUGARS, NOTIFY YOUR PRIMARY CARE PHYSICIAN FOR  INSTRUCTIONS.    8.  Please DO use your inhalers the morning of your procedure.

## 2024-02-22 ENCOUNTER — HOSPITAL ENCOUNTER (OUTPATIENT)
Facility: HOSPITAL | Age: 54
Discharge: HOME | End: 2024-02-22
Attending: STUDENT IN AN ORGANIZED HEALTH CARE EDUCATION/TRAINING PROGRAM | Admitting: STUDENT IN AN ORGANIZED HEALTH CARE EDUCATION/TRAINING PROGRAM
Payer: MEDICAID

## 2024-02-22 ENCOUNTER — ANESTHESIA (OUTPATIENT)
Dept: ENDOSCOPY | Facility: HOSPITAL | Age: 54
End: 2024-02-22
Payer: MEDICAID

## 2024-02-22 ENCOUNTER — ANESTHESIA EVENT (OUTPATIENT)
Dept: ENDOSCOPY | Facility: HOSPITAL | Age: 54
End: 2024-02-22
Payer: MEDICAID

## 2024-02-22 VITALS
TEMPERATURE: 98 F | RESPIRATION RATE: 18 BRPM | HEART RATE: 77 BPM | SYSTOLIC BLOOD PRESSURE: 120 MMHG | OXYGEN SATURATION: 99 % | DIASTOLIC BLOOD PRESSURE: 81 MMHG

## 2024-02-22 DIAGNOSIS — Z12.11 SCREENING FOR MALIGNANT NEOPLASM OF COLON: Primary | ICD-10-CM

## 2024-02-22 PROCEDURE — 63600175 PHARM REV CODE 636 W HCPCS: Performed by: NURSE ANESTHETIST, CERTIFIED REGISTERED

## 2024-02-22 PROCEDURE — 00811 ANES LWR INTST NDSC NOS: CPT | Mod: QZ | Performed by: NURSE ANESTHETIST, CERTIFIED REGISTERED

## 2024-02-22 PROCEDURE — 88305 TISSUE EXAM BY PATHOLOGIST: CPT | Mod: 26,,, | Performed by: PATHOLOGY

## 2024-02-22 PROCEDURE — 27201089 HC SNARE, DISP (ANY): Performed by: STUDENT IN AN ORGANIZED HEALTH CARE EDUCATION/TRAINING PROGRAM

## 2024-02-22 PROCEDURE — 37000009 HC ANESTHESIA EA ADD 15 MINS: Performed by: STUDENT IN AN ORGANIZED HEALTH CARE EDUCATION/TRAINING PROGRAM

## 2024-02-22 PROCEDURE — 45385 COLONOSCOPY W/LESION REMOVAL: CPT | Mod: ,,, | Performed by: STUDENT IN AN ORGANIZED HEALTH CARE EDUCATION/TRAINING PROGRAM

## 2024-02-22 PROCEDURE — 88305 TISSUE EXAM BY PATHOLOGIST: CPT | Performed by: PATHOLOGY

## 2024-02-22 PROCEDURE — D9220AH HC ANESTHESIA PROFESSIONAL FEE: Mod: QZ | Performed by: NURSE ANESTHETIST, CERTIFIED REGISTERED

## 2024-02-22 PROCEDURE — 45385 COLONOSCOPY W/LESION REMOVAL: CPT | Performed by: STUDENT IN AN ORGANIZED HEALTH CARE EDUCATION/TRAINING PROGRAM

## 2024-02-22 PROCEDURE — 37000008 HC ANESTHESIA 1ST 15 MINUTES: Performed by: STUDENT IN AN ORGANIZED HEALTH CARE EDUCATION/TRAINING PROGRAM

## 2024-02-22 RX ORDER — PROPOFOL 10 MG/ML
VIAL (ML) INTRAVENOUS
Status: DISCONTINUED | OUTPATIENT
Start: 2024-02-22 | End: 2024-02-22

## 2024-02-22 RX ADMIN — PROPOFOL 100 MG: 10 INJECTION, EMULSION INTRAVENOUS at 09:02

## 2024-02-22 RX ADMIN — SODIUM CHLORIDE, SODIUM LACTATE, POTASSIUM CHLORIDE, AND CALCIUM CHLORIDE: .6; .31; .03; .02 INJECTION, SOLUTION INTRAVENOUS at 09:02

## 2024-02-22 RX ADMIN — PROPOFOL 150 MG: 10 INJECTION, EMULSION INTRAVENOUS at 09:02

## 2024-02-22 NOTE — OP NOTE
[facility]  Colon and Rectal Surgery    [address]  [place], [place]  Tel: [phone]  Fax: [phone]  [email or web address]   MD Yordy Klein MD Brian Kann, MD W. Forrest Johnston, MD Matthew Giglia, MD Jennifer Paruch, MD William Kethman, MD Danielle Kay, MD     Patient name: Gera Jha   YOB: 1970   MRN: [id number]  Date of procedure: 02/22/2024  Referring Physician: Philip Lewis MD    Colonoscopy Report    Indication: Family history of colorectal cancer (Mother) and Screening for colon cancer     Procedure: Colonoscopy with removal of polyps or other lesions by snare technique    Findings:  Two transverse colon polyps - 3 mm and 12 mm semi-pedunculated removed with hot snare  Sigmoid diverticular disease  Approximate withdrawal time: 18 min    Surgeon: Zelalem West MD    Procedure:  After pre-operative assessment and review of informed consent, the patient was taken to the procedure room and received monitored anesthesia care. The patient was placed in left lateral decubitus position. A timeout was performed according to [facility] Quality and Safety guidelines.      A detailed perianal and digital rectal exam was performed and the endoscope scope was passed under direct vision from the anus to the Cecum, identified by the ileocecal valve and appendiceal orifice. The endoscope was then slowly withdrawn and the mucosa carefully examined. Throughout the procedure, the patient's blood pressure, pulse, and oxygen saturations were monitored continuously. The colonoscopy was performed without difficulty. The patient tolerated the procedure well. The quality of the bowel preparation was good . Please see findings as discussed above.     Complications: None  Estimated blood loss: Minimal  Disposition: PACU and then Home      Recommendations:  Repeat exam in 3 years  Return to referring physicians office as previously scheduled,  may follow-up with me as needed  High fiber diet      Zelalem West MD, FACS, FASCRS  Department of Colon & Rectal Surgery  [facility]

## 2024-02-22 NOTE — ANESTHESIA POSTPROCEDURE EVALUATION
Anesthesia Post Evaluation    Patient: Gera Jha    Procedure(s) Performed: Procedure(s) (LRB):  COLONOSCOPY (N/A)    Final Anesthesia Type: general      Patient location during evaluation: PACU  Patient participation: Yes- Able to Participate  Level of consciousness: awake and alert, oriented and awake  Post-procedure vital signs: reviewed and stable  Pain management: adequate  Airway patency: patent    PONV status at discharge: No PONV  Anesthetic complications: no      Cardiovascular status: blood pressure returned to baseline, hemodynamically stable and stable  Respiratory status: unassisted, spontaneous ventilation and room air  Hydration status: euvolemic  Follow-up not needed.              Vitals Value Taken Time   /81 02/22/24 1004   Temp 36.5 °C (97.7 °F) 02/22/24 0935   Pulse 77 02/22/24 1004   Resp 18 02/22/24 1004   SpO2 99 % 02/22/24 1004         Event Time   Out of Recovery 10:04:58         Pain/Radha Score: Radha Score: 10 (2/22/2024 10:03 AM)

## 2024-02-22 NOTE — H&P
[facility]  Colon and Rectal Surgery    [address]  [place], [place]  Tel: [phone]  Fax: [phone]  [email or web address]   MD Yordy Klein MD Brian Kann, MD W. Forrest Johnston, MD Matthew Giglia, MD Jennifer Paruch, MD William Kethman, MD Danielle Kay, MD     Patient name: Gera Jha   YOB: 1970   MRN: [id number]  Date of procedure: 02/22/2024    Procedure: Colonoscopy  Indications: Family history of colorectal cancer (Mother) and Screening for colon cancer    No history of colonoscopy    The patient was informed of the availability of a certified  without charge. A certified  was not necessary for this visit.    Sedation plan: MAC  ASA: ASA 2 - Patient with mild systemic disease with no functional limitations    Review of Systems  See above    History reviewed. No pertinent past medical history.  History reviewed. No pertinent surgical history.  Family History   Problem Relation Age of Onset    Cancer Mother     Breast cancer Mother     Colon cancer Mother     Cancer Maternal Grandmother     Lung cancer Maternal Grandfather      Social History     Tobacco Use    Smoking status: Former     Types: Cigarettes    Smokeless tobacco: Never   Substance Use Topics    Alcohol use: Yes     Comment: occ    Drug use: Not Currently     Review of patient's allergies indicates:   Allergen Reactions    First aid ointment w/pramoxin [[id number]-pramoxine]     Penicillins        Prior to Admission medications    Medication Sig Start Date End Date Taking? Authorizing Provider   diclofenac (VOLTAREN) 75 MG EC tablet Take 1 tablet (75 mg total) by mouth 2 (two) times daily as needed (knee pain). 1/19/24  Yes Philip Lewis MD   bisacodyL (DULCOLAX) 5 mg EC tablet Take 4 tablets (20 mg total) by mouth once. for 1 dose 2/21/24 2/21/24  Zelalem West MD   sod picosulf-mag ox-citric ac (CLENPIQ) 10  mg-3.5 gram- 12 gram/175 mL Soln Take 2 Bottles by mouth once. for 1 dose 2/21/24 2/21/24  Zelalem West MD       Physical Examination  There were no vitals taken for this visit.     Constitutional: well developed, no cough, no dyspnea, alert, and no acute distress    Head: Normocephalic, no lesions, without obvious abnormality  Eye: Normal external eye, conjunctiva, and lids, PERRL  Cardiovascular: regular rate and regular rhythm  Respiratory: normal air entry  Gastrointestinal: soft, non-tender, without masses or organomegaly  Neurologic: alert, oriented, normal speech, no focal findings or movement disorder noted  Psychiatric: appropriate, normal mood    Plan of Care    It was a pleasure meeting Mr. Jha today - we will plan to perform a colonoscopy with monitored anesthesia care. The details of the procedure, the possible need for biopsy or polypectomy and the potential risks including bleeding, perforation, missed polyps were discussed in detail and they consented to undergo the procedure.      Zelalem West MD, FACS, FASCRS  Department of Colon & Rectal Surgery  [facility]

## 2024-02-22 NOTE — TRANSFER OF CARE
Anesthesia Transfer of Care Note    Patient: Gera Jha    Procedure(s) Performed: Procedure(s) (LRB):  COLONOSCOPY (N/A)    Patient location: PACU    Anesthesia Type: general    Transport from OR: Transported from OR on room air with adequate spontaneous ventilation    Post pain: adequate analgesia    Post assessment: no apparent anesthetic complications and tolerated procedure well    Post vital signs: stable    Level of consciousness: sedated    Nausea/Vomiting: no nausea/vomiting    Complications: none    Transfer of care protocol was followed    Last vitals: There were no vitals taken for this visit.

## 2024-02-22 NOTE — ANESTHESIA PREPROCEDURE EVALUATION
02/22/2024  Gera Jha is a 53 y.o., male.      Pre-op Assessment    I have reviewed the Patient Summary Reports.     I have reviewed the Nursing Notes. I have reviewed the NPO Status.   I have reviewed the Medications.     Review of Systems  Anesthesia Hx:  No problems with previous Anesthesia                Social:  No Alcohol Use, Former Smoker       Hematology/Oncology:  Hematology Normal   Oncology Normal                                   EENT/Dental:  EENT/Dental Normal           Cardiovascular:  Cardiovascular Normal Exercise tolerance: good                                           Renal/:  Renal/ Normal                 Hepatic/GI:  Hepatic/GI Normal                 Musculoskeletal:  Musculoskeletal Normal                Neurological:  Neurology Normal                                      Endocrine:  Endocrine Normal            Dermatological:  Skin Normal    Psych:  Psychiatric Normal                  Physical Exam  General: Well nourished    Airway:  Mallampati: II   Mouth Opening: Normal  TM Distance: Normal  Tongue: Normal  Neck ROM: Normal ROM    Chest/Lungs:  Clear to auscultation    Heart:  Rate: Normal  Rhythm: Regular Rhythm  Sounds: Normal      Anesthesia Plan  Type of Anesthesia, risks & benefits discussed:    Anesthesia Type: Gen ETT  Intra-op Monitoring Plan: Standard ASA Monitors  Post Op Pain Control Plan: multimodal analgesia  Induction:  IV  Airway Plan: Direct  Informed Consent: Informed consent signed with the Patient and all parties understand the risks and agree with anesthesia plan.  All questions answered.   ASA Score: 2  Day of Surgery Review of History & Physical: H&P Update referred to the surgeon/provider.I have interviewed and examined the patient. I have reviewed the patient's H&P dated: 2/22/24. There are no significant changes.     Ready For Surgery From  Anesthesia Perspective.     .

## 2024-02-22 NOTE — DISCHARGE SUMMARY
Brief Operative Note    Date of surgery: 02/22/2024    Pre-operative Diagnosis:  Colon cancer screening [Z12.11]  Family history of colon cancer in mother [Z80.0]     Post-operative Diagnosis:   Same as above    Surgeon: Zelalem West M.D.    Procedure:  Colonoscopy     Anesthesia: Monitored anesthesia care     Findings:  See operative note    Estimated blood loss: Minimal         Specimens:   See operative note    Discharge Note    Outcome:   Patient tolerated treatment/procedure well without complication and is now ready for discharge.    Disposition:   Home or Self Care    Final diagnosis:    Colon cancer screening [Z12.11]  Family history of colon cancer in mother [Z80.0]    Follow-up:   With primary care provider    Discharge Procedure Orders   Diet Adult Regular     No dressing needed     Notify your health care provider if you experience any of the following:  temperature >100.4     Notify your health care provider if you experience any of the following:  persistent nausea and vomiting or diarrhea     Notify your health care provider if you experience any of the following:  severe uncontrolled pain     Notify your health care provider if you experience any of the following:  redness, tenderness, or signs of infection (pain, swelling, redness, odor or green/yellow discharge around incision site)     Notify your health care provider if you experience any of the following:  difficulty breathing or increased cough     Notify your health care provider if you experience any of the following:  severe persistent headache     Notify your health care provider if you experience any of the following:  worsening rash     Notify your health care provider if you experience any of the following:  persistent dizziness, light-headedness, or visual disturbances     Notify your health care provider if you experience any of the following:  increased confusion or weakness     Activity as tolerated       [id number]     Zelalem  MD Jenna

## 2024-02-26 LAB
FINAL PATHOLOGIC DIAGNOSIS: NORMAL
GROSS: NORMAL
Lab: NORMAL

## 2024-04-13 DIAGNOSIS — M25.562 CHRONIC PAIN OF LEFT KNEE: ICD-10-CM

## 2024-04-13 DIAGNOSIS — G89.29 CHRONIC PAIN OF LEFT KNEE: ICD-10-CM

## 2024-04-13 NOTE — TELEPHONE ENCOUNTER
No care due was identified.  [facility] Embedded Care Due Messages. Reference number: [id number].   4/13/2024 7:06:07 AM CDT

## 2024-04-16 RX ORDER — DICLOFENAC SODIUM 75 MG/1
TABLET, DELAYED RELEASE ORAL
Qty: 60 TABLET | Refills: 0 | Status: SHIPPED | OUTPATIENT
Start: 2024-04-16 | End: 2024-05-14

## 2024-05-12 DIAGNOSIS — G89.29 CHRONIC PAIN OF LEFT KNEE: ICD-10-CM

## 2024-05-12 DIAGNOSIS — M25.562 CHRONIC PAIN OF LEFT KNEE: ICD-10-CM

## 2024-05-12 NOTE — TELEPHONE ENCOUNTER
No care due was identified.  [facility] Embedded Care Due Messages. Reference number: [id number].   5/12/2024 8:06:59 AM CDT

## 2024-05-14 RX ORDER — DICLOFENAC SODIUM 75 MG/1
TABLET, DELAYED RELEASE ORAL
Qty: 60 TABLET | Refills: 2 | Status: SHIPPED | OUTPATIENT
Start: 2024-05-14

## 2024-07-09 ENCOUNTER — HOSPITAL ENCOUNTER (EMERGENCY)
Facility: HOSPITAL | Age: 54
Discharge: HOME | End: 2024-07-10
Attending: SURGERY
Payer: MEDICAID

## 2024-07-09 VITALS
HEIGHT: 74 IN | DIASTOLIC BLOOD PRESSURE: 60 MMHG | WEIGHT: 212.19 LBS | OXYGEN SATURATION: 98 % | TEMPERATURE: 100 F | SYSTOLIC BLOOD PRESSURE: 110 MMHG | RESPIRATION RATE: 18 BRPM | BODY MASS INDEX: 27.23 KG/M2 | HEART RATE: 87 BPM

## 2024-07-09 DIAGNOSIS — J02.0 STREP PHARYNGITIS: Primary | ICD-10-CM

## 2024-07-09 DIAGNOSIS — R50.9 FEVER, UNSPECIFIED FEVER CAUSE: ICD-10-CM

## 2024-07-09 DIAGNOSIS — R05.9 COUGH, UNSPECIFIED TYPE: ICD-10-CM

## 2024-07-09 LAB
GROUP A STREP, MOLECULAR: POSITIVE
SARS-COV-2 RDRP RESP QL NAA+PROBE: NEGATIVE

## 2024-07-09 PROCEDURE — 87502 INFLUENZA DNA AMP PROBE: CPT | Performed by: SURGERY

## 2024-07-09 PROCEDURE — 87651 STREP A DNA AMP PROBE: CPT | Performed by: SURGERY

## 2024-07-09 PROCEDURE — U0002 COVID-19 LAB TEST NON-CDC: HCPCS | Performed by: SURGERY

## 2024-07-09 PROCEDURE — 25000003 PHARM REV CODE 250: Performed by: SURGERY

## 2024-07-09 PROCEDURE — 99284 EMERGENCY DEPT VISIT MOD MDM: CPT | Mod: 25

## 2024-07-09 RX ORDER — BENZONATATE 100 MG/1
200 CAPSULE ORAL 3 TIMES DAILY PRN
Qty: 20 CAPSULE | Refills: 0 | Status: SHIPPED | OUTPATIENT
Start: 2024-07-09 | End: 2024-07-19

## 2024-07-09 RX ORDER — CLARITHROMYCIN 500 MG/1
500 TABLET, FILM COATED ORAL EVERY 12 HOURS
Qty: 20 TABLET | Refills: 0 | Status: SHIPPED | OUTPATIENT
Start: 2024-07-09 | End: 2024-07-19

## 2024-07-09 RX ORDER — CLARITHROMYCIN 250 MG/1
500 TABLET, FILM COATED ORAL
Status: COMPLETED | OUTPATIENT
Start: 2024-07-10 | End: 2024-07-10

## 2024-07-09 RX ORDER — ACETAMINOPHEN 500 MG
1000 TABLET ORAL
Status: COMPLETED | OUTPATIENT
Start: 2024-07-09 | End: 2024-07-09

## 2024-07-09 RX ADMIN — ACETAMINOPHEN 1000 MG: 500 TABLET ORAL at 10:07

## 2024-07-10 LAB
INFLUENZA A, MOLECULAR: NEGATIVE
INFLUENZA B, MOLECULAR: NEGATIVE
SPECIMEN SOURCE: NORMAL

## 2024-07-10 PROCEDURE — 25000003 PHARM REV CODE 250: Performed by: SURGERY

## 2024-07-10 RX ADMIN — CLARITHROMYCIN 500 MG: 250 TABLET, FILM COATED ORAL at 12:07

## 2024-07-10 NOTE — ED PROVIDER NOTES
Encounter Date: 7/9/2024       History     Chief Complaint   Patient presents with    URI     Patient to ED with c/o productive cough, fever and generalized body aches.      History of Present Illness  Gera Jha is a 54 y.o. male that presents with a sore throat tonight  Sore throat & cough cold symptoms with a low-grade temperature tonight  Patient states he has had several sick contacts over the weekend as well  (+) mild pharyngitis without tonsillitis or exudate, no peritonsillar abscess  No stridor, no drooling, no hot potato voice, normal phonation & swallowing  Patient also with a dry hacking cough with clear lung sounds in all fields  Patient denies any nausea vomiting or diarrhea on ER interview this p.m.    The history is provided by the patient.     Review of patient's allergies indicates:   Allergen Reactions    First aid ointment w/pramoxin [[id number]-pramoxine]     Penicillins      History reviewed. No pertinent past medical history.  Past Surgical History:   Procedure Laterality Date    COLONOSCOPY N/A 2/22/2024    Procedure: COLONOSCOPY;  Surgeon: Zelalem West MD;  Location: [facility];  Service: Colon and Rectal;  Laterality: N/A;     Family History   Problem Relation Name Age of Onset    Cancer Mother      Breast cancer Mother      Colon cancer Mother      Cancer Maternal Grandmother      Lung cancer Maternal Grandfather       Social History     Tobacco Use    Smoking status: Former     Types: Cigarettes    Smokeless tobacco: Never   Substance Use Topics    Alcohol use: Yes     Comment: occ    Drug use: Not Currently     Review of Systems   Constitutional:  Positive for fever. Negative for diaphoresis and fatigue.   HENT:  Positive for sneezing and sore throat. Negative for ear pain, hearing loss and tinnitus.    Eyes:  Negative for pain and redness.   Respiratory:  Positive for cough. Negative for shortness of breath and wheezing.    Cardiovascular:  Negative for chest pain.    Gastrointestinal:  Negative for abdominal pain, constipation, diarrhea, nausea and rectal pain.   Musculoskeletal:  Negative for back pain, neck pain and neck stiffness.   Neurological:  Negative for dizziness, seizures, numbness and headaches.   Psychiatric/Behavioral:  Negative for confusion, decreased concentration and dysphoric mood.    All other systems reviewed and are negative.      Physical Exam     Initial Vitals [07/09/24 2250]   BP Pulse Resp Temp SpO2   138/66 100 18 (!) 100.9 °F (38.3 °C) 98 %      MAP       --         Physical Exam    Nursing note and vitals reviewed.  Constitutional: Vital signs are normal. He appears well-developed and well-nourished. He is cooperative.   HENT:   Head: Normocephalic and atraumatic.   Mouth/Throat: Uvula is midline and mucous membranes are normal.   (+) mild pharyngitis without tonsillitis or exudate   Eyes: Conjunctivae, EOM and lids are normal. Pupils are equal, round, and reactive to light.   Neck: Neck supple.   Normal range of motion.   Full passive range of motion without pain.     Cardiovascular:  Normal rate, regular rhythm, S1 normal, S2 normal, normal heart sounds, intact distal pulses and normal pulses.           Pulmonary/Chest: Effort normal and breath sounds normal.   Abdominal: Abdomen is soft and flat. Bowel sounds are normal.   Musculoskeletal:         General: Normal range of motion.      Cervical back: Full passive range of motion without pain, normal range of motion and neck supple.     Neurological: He is alert and oriented to person, place, and time. He has normal strength.   Skin: Skin is warm, dry and intact. Capillary refill takes less than 2 seconds.         ED Course   Procedures  Labs Reviewed   GROUP A STREP, MOLECULAR - Abnormal; Notable for the following components:       Result Value    Group A Strep, Molecular Positive (*)     All other components within normal limits   INFLUENZA A & B BY MOLECULAR   SARS-COV-2 RNA AMPLIFICATION,  QUAL          Imaging Results              X-Ray Chest 1 View (Final result)  Result time 07/09/24 23:14:16      Final result by Denver Kraft MD (07/09/24 23:14:16)                   Impression:      No radiographic evidence of acute intrathoracic process on this single view..      Electronically signed by: Denver Kraft MD  Date:    07/09/2024  Time:    23:14               Narrative:    EXAMINATION:  XR CHEST 1 VIEW    CLINICAL HISTORY:  Cough;    TECHNIQUE:  Single frontal view of the chest was performed.    COMPARISON:  None    FINDINGS:  Cardiac silhouette appears within normal limits.  Lungs appear well expanded without evidence of confluent airspace consolidation.  No significant volume of pleural fluid or pneumothorax identified.  The visualized osseous structures demonstrate mild degenerative change.                                       Medications   acetaminophen tablet 1,000 mg (1,000 mg Oral Given 7/9/24 2258)   clarithromycin tablet 500 mg (500 mg Oral Given 7/10/24 0003)     Medical Decision Making  Sore throat, low-grade temperature & cough cold symptoms on ER evaluation   Differential: flu, strep, COVID, bronchitis, pneumonia, URI, virus, otitis media    Problems Addressed:  Cough, unspecified type: complicated acute illness or injury  Fever, unspecified fever cause: complicated acute illness or injury  Strep pharyngitis: complicated acute illness or injury    Amount and/or Complexity of Data Reviewed  Labs: ordered. Decision-making details documented in ED Course.  Radiology: ordered and independent interpretation performed.    ED Management & Risks of Complication, Morbidity, & Mortality:  (+) strep swab in the ER, (-) COVID, (-) flu, clear chest x-ray  Patient was allergic to penicillin, clarithromycin given in the ER   Prescription of erythromycin & Tessalon Perles on ER discharge  Fever control with Tylenol & or Motrin on ER discharge this p.m.  Follow-up with primary care physician  this wk, voiced understanding  Patient counseled to return to the ER with any concerns on DC    Need for Hospitalization or Surgery with Social Determinants of Health:  This patient does not need to be hospitalized on ER evaluation today  The patient's diagnosis is not limited by social determinants of health  Does not require surgery or procedure (major or minor), no risk factors    Clinical Impression:  Final diagnoses:  [R05.9] Cough, unspecified type  [R50.9] Fever, unspecified fever cause  [J02.0] Strep pharyngitis (Primary)          ED Disposition Condition    Discharge Stable          ED Prescriptions       Medication Sig Dispense Start Date End Date Auth. Provider    clarithromycin (BIAXIN) 500 MG tablet Take 1 tablet (500 mg total) by mouth every 12 (twelve) hours. for 10 days 20 tablet 7/9/2024 7/19/2024 Kemal Salinas MD    benzonatate (TESSALON) 100 MG capsule Take 2 capsules (200 mg total) by mouth 3 (three) times daily as needed for Cough. 20 capsule 7/9/2024 7/19/2024 Kemal Salinas MD          Follow-up Information       Follow up With Specialties Details Why Contact Info    Philip Lewis MD Family Medicine Schedule an appointment as soon as possible for a visit in 2 days  [address] [id number]  [phone]               Kemal Salinas MD  07/10/24 0032

## 2024-08-12 DIAGNOSIS — M25.562 CHRONIC PAIN OF LEFT KNEE: ICD-10-CM

## 2024-08-12 DIAGNOSIS — G89.29 CHRONIC PAIN OF LEFT KNEE: ICD-10-CM

## 2024-08-12 NOTE — TELEPHONE ENCOUNTER
LOV: 01/19/2024    Patient requesting refill of: diclofenac (VOLTAREN) 75 MG EC tablet    Medication pended    Please advise

## 2024-08-12 NOTE — TELEPHONE ENCOUNTER
No care due was identified.  Health [facility] Embedded Care Due Messages. Reference number: [id number].   8/12/2024 10:19:12 AM CDT

## 2024-08-13 RX ORDER — DICLOFENAC SODIUM 75 MG/1
TABLET, DELAYED RELEASE ORAL
Qty: 60 TABLET | Refills: 1 | Status: SHIPPED | OUTPATIENT
Start: 2024-08-13

## 2024-10-12 DIAGNOSIS — M25.562 CHRONIC PAIN OF LEFT KNEE: ICD-10-CM

## 2024-10-12 DIAGNOSIS — G89.29 CHRONIC PAIN OF LEFT KNEE: ICD-10-CM

## 2024-10-12 NOTE — TELEPHONE ENCOUNTER
No care due was identified.  Health [facility] Embedded Care Due Messages. Reference number: [id number].   10/12/2024 7:07:00 AM CDT

## 2024-10-15 RX ORDER — DICLOFENAC SODIUM 75 MG/1
TABLET, DELAYED RELEASE ORAL
Qty: 60 TABLET | Refills: 0 | Status: SHIPPED | OUTPATIENT
Start: 2024-10-15

## 2024-11-14 DIAGNOSIS — G89.29 CHRONIC PAIN OF LEFT KNEE: ICD-10-CM

## 2024-11-14 DIAGNOSIS — M25.562 CHRONIC PAIN OF LEFT KNEE: ICD-10-CM

## 2024-11-14 RX ORDER — DICLOFENAC SODIUM 75 MG/1
TABLET, DELAYED RELEASE ORAL
Qty: 60 TABLET | Refills: 1 | Status: SHIPPED | OUTPATIENT
Start: 2024-11-14

## 2024-11-14 NOTE — TELEPHONE ENCOUNTER
No care due was identified.  [facility] Embedded Care Due Messages. Reference number: [id number].   11/14/2024 7:05:01 AM CST

## 2025-01-12 DIAGNOSIS — M25.562 CHRONIC PAIN OF LEFT KNEE: ICD-10-CM

## 2025-01-12 DIAGNOSIS — G89.29 CHRONIC PAIN OF LEFT KNEE: ICD-10-CM

## 2025-01-12 NOTE — TELEPHONE ENCOUNTER
Care Due:                  Date            Visit Type   Department     Provider  --------------------------------------------------------------------------------                                NP -                              PRIMARY      [facility]  Last Visit: 01-      CARE ([facility])   MEDICINE       Philip Lewis  Next Visit: None Scheduled  None         None Found                                                            Last  Test          Frequency    Reason                     Performed    Due Date  --------------------------------------------------------------------------------    Office Visit  12 months..  diclofenac...............  01- 01-    CBC.........  12 months..  diclofenac...............  01- 01-    Cr..........  12 months..  diclofenac...............  01- 01-    [facility] [employer] Embedded Care Due Messages. Reference number: [id number].   1/12/2025 8:07:17 AM CST

## 2025-01-14 RX ORDER — DICLOFENAC SODIUM 75 MG/1
TABLET, DELAYED RELEASE ORAL
Qty: 60 TABLET | Refills: 2 | Status: SHIPPED | OUTPATIENT
Start: 2025-01-14

## 2025-02-27 ENCOUNTER — OFFICE VISIT (OUTPATIENT)
Dept: FAMILY MEDICINE | Facility: CLINIC | Age: 55
End: 2025-02-27
Payer: MEDICAID

## 2025-02-27 VITALS
BODY MASS INDEX: 30.06 KG/M2 | SYSTOLIC BLOOD PRESSURE: 126 MMHG | OXYGEN SATURATION: 97 % | HEIGHT: 74 IN | WEIGHT: 234.25 LBS | HEART RATE: 94 BPM | RESPIRATION RATE: 14 BRPM | DIASTOLIC BLOOD PRESSURE: 58 MMHG

## 2025-02-27 DIAGNOSIS — J40 BRONCHITIS: ICD-10-CM

## 2025-02-27 DIAGNOSIS — Z00.00 ENCOUNTER FOR SCREENING AND PREVENTATIVE CARE: ICD-10-CM

## 2025-02-27 DIAGNOSIS — E66.811 CLASS 1 OBESITY WITHOUT SERIOUS COMORBIDITY WITH BODY MASS INDEX (BMI) OF 30.0 TO 30.9 IN ADULT, UNSPECIFIED OBESITY TYPE: ICD-10-CM

## 2025-02-27 DIAGNOSIS — Z13.220 ENCOUNTER FOR LIPID SCREENING FOR CARDIOVASCULAR DISEASE: ICD-10-CM

## 2025-02-27 DIAGNOSIS — R07.9 CHEST PAIN, UNSPECIFIED TYPE: ICD-10-CM

## 2025-02-27 DIAGNOSIS — Z13.1 DIABETES MELLITUS SCREENING: ICD-10-CM

## 2025-02-27 DIAGNOSIS — Z13.6 ENCOUNTER FOR LIPID SCREENING FOR CARDIOVASCULAR DISEASE: ICD-10-CM

## 2025-02-27 DIAGNOSIS — M17.12 PRIMARY OSTEOARTHRITIS OF LEFT KNEE: ICD-10-CM

## 2025-02-27 DIAGNOSIS — G89.29 CHRONIC PAIN OF LEFT KNEE: Primary | ICD-10-CM

## 2025-02-27 DIAGNOSIS — M25.562 CHRONIC PAIN OF LEFT KNEE: Primary | ICD-10-CM

## 2025-02-27 DIAGNOSIS — Z12.5 SCREENING PSA (PROSTATE SPECIFIC ANTIGEN): ICD-10-CM

## 2025-02-27 PROCEDURE — 99213 OFFICE O/P EST LOW 20 MIN: CPT | Mod: PBBFAC | Performed by: STUDENT IN AN ORGANIZED HEALTH CARE EDUCATION/TRAINING PROGRAM

## 2025-02-27 PROCEDURE — 99999 PR PBB SHADOW E&M-EST. PATIENT-LVL III: CPT | Mod: PBBFAC,,, | Performed by: STUDENT IN AN ORGANIZED HEALTH CARE EDUCATION/TRAINING PROGRAM

## 2025-02-27 RX ORDER — AZITHROMYCIN 250 MG/1
TABLET, FILM COATED ORAL
Qty: 6 TABLET | Refills: 0 | Status: SHIPPED | OUTPATIENT
Start: 2025-02-27 | End: 2025-03-04

## 2025-02-27 NOTE — PROGRESS NOTES
Subjective:       Patient ID: Gera Jha is a 54 y.o. male.    Chief Complaint: Annual Exam (Patient is here today for an annual visit. )    Pt here for follow-up    He reports congestion and cough productive of mucus for the last 2 days. He denies fever/chills. He has a sore throat.     Chronic left knee pain/OA: He has been having left knee pain for the last few years. He reports he twisted it years ago, but no other trauma. He denies any joint swelling, erythema or warmth. No significant exacerbating factors. At last visit, we did an xray which showed degenerative changes. He completed PT since last visit with little improvement.    He reports intermittent chest pain that most often occurs with activity like walking the dog or cutting grass. Usually lasts 5-10 min. No SOB. No diaphoresis. No nausea or vomiting. He has significant heart disease in his family.    He had a screening c-scope since last visit with polyps, repeat recommended in 3 yrs and due 02/2027    Review of Systems   Constitutional:  Negative for chills and fever.   HENT:  Negative for congestion and sore throat.    Respiratory:  Negative for cough and shortness of breath.    Cardiovascular:  Negative for chest pain.   Gastrointestinal:  Negative for abdominal pain, diarrhea, nausea and vomiting.   Genitourinary:  Negative for dysuria and hematuria.   Musculoskeletal:  Positive for arthralgias. Negative for joint swelling.   Neurological:  Negative for dizziness, syncope and light-headedness.       Objective:      Physical Exam  Vitals reviewed.   Constitutional:       General: He is not in acute distress.  HENT:      Head: Normocephalic and atraumatic.   Cardiovascular:      Rate and Rhythm: Normal rate and regular rhythm.   Musculoskeletal:      Cervical back: Neck supple.   Neurological:      Mental Status: He is alert.         Assessment:       1. Chronic pain of left knee    2. Screening PSA (prostate specific antigen)    3. Encounter  for lipid screening for cardiovascular disease    4. Class 1 obesity without serious comorbidity with body mass index (BMI) of 30.0 to 30.9 in adult, unspecified obesity type    5. Encounter for screening and preventative care    6. Primary osteoarthritis of left knee    7. Diabetes mellitus screening    8. Chest pain, unspecified type    9. Bronchitis          Plan:           1. Chronic pain of left knee  -     Ambulatory referral/consult to Orthopedics; Future; Expected date: 03/06/2025    2. Screening PSA (prostate specific antigen)  -     PSA, Screening; Future; Expected date: 02/27/2025    3. Encounter for lipid screening for cardiovascular disease  -     Lipid Panel; Future; Expected date: 02/27/2025    4. Class 1 obesity without serious comorbidity with body mass index (BMI) of 30.0 to 30.9 in adult, unspecified obesity type  -     Hemoglobin A1C; Future; Expected date: 02/27/2025    5. Encounter for screening and preventative care  -     CBC Auto Differential; Future; Expected date: 02/27/2025  -     Comprehensive Metabolic Panel; Future; Expected date: 02/27/2025  -     Lipid Panel; Future; Expected date: 02/27/2025    6. Primary osteoarthritis of left knee  -     Ambulatory referral/consult to Orthopedics; Future; Expected date: 03/06/2025    7. Diabetes mellitus screening  -     Hemoglobin A1C; Future; Expected date: 02/27/2025    8. Chest pain, unspecified type  -     Ambulatory referral/consult to Cardiology; Future; Expected date: 03/06/2025    9. Bronchitis  -     azithromycin (Z-WILFREDO) 250 MG tablet; Take 2 tablets by mouth on day 1; Take 1 tablet by mouth on days 2-5  Dispense: 6 tablet; Refill: 0    Abx as ordered  Labs as ordered  Refer ortho for chronic knee pain and OA on imaging  Cont Diclofenac PRN  Considering his family history of CAD and chest pain with exertion, refer cardiology for potential stress test  RTC 3 months or sooner if needed

## 2025-02-28 ENCOUNTER — CLINICAL SUPPORT (OUTPATIENT)
Dept: FAMILY MEDICINE | Facility: CLINIC | Age: 55
End: 2025-02-28
Payer: MEDICAID

## 2025-02-28 DIAGNOSIS — Z13.220 ENCOUNTER FOR LIPID SCREENING FOR CARDIOVASCULAR DISEASE: ICD-10-CM

## 2025-02-28 DIAGNOSIS — Z12.5 SCREENING PSA (PROSTATE SPECIFIC ANTIGEN): ICD-10-CM

## 2025-02-28 DIAGNOSIS — Z00.00 ENCOUNTER FOR SCREENING AND PREVENTATIVE CARE: ICD-10-CM

## 2025-02-28 DIAGNOSIS — E66.811 CLASS 1 OBESITY WITHOUT SERIOUS COMORBIDITY WITH BODY MASS INDEX (BMI) OF 30.0 TO 30.9 IN ADULT, UNSPECIFIED OBESITY TYPE: ICD-10-CM

## 2025-02-28 DIAGNOSIS — Z13.6 ENCOUNTER FOR LIPID SCREENING FOR CARDIOVASCULAR DISEASE: ICD-10-CM

## 2025-02-28 DIAGNOSIS — Z13.1 DIABETES MELLITUS SCREENING: ICD-10-CM

## 2025-02-28 LAB
ALBUMIN SERPL BCP-MCNC: 3.7 G/DL (ref 3.5–5.2)
ALP SERPL-CCNC: 57 U/L (ref 40–150)
ALT SERPL W/O P-5'-P-CCNC: 16 U/L (ref 10–44)
ANION GAP SERPL CALC-SCNC: 8 MMOL/L (ref 8–16)
AST SERPL-CCNC: 15 U/L (ref 10–40)
BASOPHILS # BLD AUTO: 0.06 K/UL (ref 0–0.2)
BASOPHILS NFR BLD: 0.6 % (ref 0–1.9)
BILIRUB SERPL-MCNC: 1.5 MG/DL (ref 0.1–1)
BUN SERPL-MCNC: 22 MG/DL (ref 6–20)
CALCIUM SERPL-MCNC: 8.9 MG/DL (ref 8.7–10.5)
CHLORIDE SERPL-SCNC: 108 MMOL/L (ref 95–110)
CHOLEST SERPL-MCNC: 127 MG/DL (ref 120–199)
CHOLEST/HDLC SERPL: 2.4 {RATIO} (ref 2–5)
CO2 SERPL-SCNC: 26 MMOL/L (ref 23–29)
COMPLEXED PSA SERPL-MCNC: 0.21 NG/ML (ref 0–4)
CREAT SERPL-MCNC: 0.8 MG/DL (ref 0.5–1.4)
DIFFERENTIAL METHOD BLD: ABNORMAL
EOSINOPHIL # BLD AUTO: 0.7 K/UL (ref 0–0.5)
EOSINOPHIL NFR BLD: 7.9 % (ref 0–8)
ERYTHROCYTE [DISTWIDTH] IN BLOOD BY AUTOMATED COUNT: 13.2 % (ref 11.5–14.5)
EST. GFR  (NO RACE VARIABLE): >60 ML/MIN/1.73 M^2
ESTIMATED AVG GLUCOSE: 111 MG/DL (ref 68–131)
GLUCOSE SERPL-MCNC: 122 MG/DL (ref 70–110)
HBA1C MFR BLD: 5.5 % (ref 4–5.6)
HCT VFR BLD AUTO: 40.9 % (ref 40–54)
HDLC SERPL-MCNC: 52 MG/DL (ref 40–75)
HDLC SERPL: 40.9 % (ref 20–50)
HGB BLD-MCNC: 13.5 G/DL (ref 14–18)
IMM GRANULOCYTES # BLD AUTO: 0.02 K/UL (ref 0–0.04)
IMM GRANULOCYTES NFR BLD AUTO: 0.2 % (ref 0–0.5)
LDLC SERPL CALC-MCNC: 65.2 MG/DL (ref 63–159)
LYMPHOCYTES # BLD AUTO: 1.3 K/UL (ref 1–4.8)
LYMPHOCYTES NFR BLD: 14.5 % (ref 18–48)
MCH RBC QN AUTO: 29 PG (ref 27–31)
MCHC RBC AUTO-ENTMCNC: 33 G/DL (ref 32–36)
MCV RBC AUTO: 88 FL (ref 82–98)
MONOCYTES # BLD AUTO: 0.7 K/UL (ref 0.3–1)
MONOCYTES NFR BLD: 7.4 % (ref 4–15)
NEUTROPHILS # BLD AUTO: 6.4 K/UL (ref 1.8–7.7)
NEUTROPHILS NFR BLD: 69.4 % (ref 38–73)
NONHDLC SERPL-MCNC: 75 MG/DL
NRBC BLD-RTO: 0 /100 WBC
PLATELET # BLD AUTO: 200 K/UL (ref 150–450)
PMV BLD AUTO: 12.1 FL (ref 9.2–12.9)
POTASSIUM SERPL-SCNC: 4.1 MMOL/L (ref 3.5–5.1)
PROT SERPL-MCNC: 7.3 G/DL (ref 6–8.4)
RBC # BLD AUTO: 4.65 M/UL (ref 4.6–6.2)
SODIUM SERPL-SCNC: 142 MMOL/L (ref 136–145)
TRIGL SERPL-MCNC: 49 MG/DL (ref 30–150)
WBC # BLD AUTO: 9.27 K/UL (ref 3.9–12.7)

## 2025-02-28 PROCEDURE — 85025 COMPLETE CBC W/AUTO DIFF WBC: CPT | Performed by: STUDENT IN AN ORGANIZED HEALTH CARE EDUCATION/TRAINING PROGRAM

## 2025-02-28 PROCEDURE — 84153 ASSAY OF PSA TOTAL: CPT | Performed by: STUDENT IN AN ORGANIZED HEALTH CARE EDUCATION/TRAINING PROGRAM

## 2025-02-28 PROCEDURE — 83036 HEMOGLOBIN GLYCOSYLATED A1C: CPT | Performed by: STUDENT IN AN ORGANIZED HEALTH CARE EDUCATION/TRAINING PROGRAM

## 2025-02-28 PROCEDURE — 80053 COMPREHEN METABOLIC PANEL: CPT | Performed by: STUDENT IN AN ORGANIZED HEALTH CARE EDUCATION/TRAINING PROGRAM

## 2025-02-28 PROCEDURE — 80061 LIPID PANEL: CPT | Performed by: STUDENT IN AN ORGANIZED HEALTH CARE EDUCATION/TRAINING PROGRAM

## 2025-03-05 ENCOUNTER — OFFICE VISIT (OUTPATIENT)
Dept: CARDIOLOGY | Facility: CLINIC | Age: 55
End: 2025-03-05
Payer: MEDICAID

## 2025-03-05 ENCOUNTER — RESULTS FOLLOW-UP (OUTPATIENT)
Dept: FAMILY MEDICINE | Facility: CLINIC | Age: 55
End: 2025-03-05

## 2025-03-05 VITALS
WEIGHT: 231 LBS | HEART RATE: 76 BPM | HEIGHT: 74 IN | RESPIRATION RATE: 18 BRPM | BODY MASS INDEX: 29.65 KG/M2 | SYSTOLIC BLOOD PRESSURE: 126 MMHG | DIASTOLIC BLOOD PRESSURE: 76 MMHG | OXYGEN SATURATION: 98 %

## 2025-03-05 DIAGNOSIS — R07.9 CHEST PAIN, UNSPECIFIED TYPE: Primary | ICD-10-CM

## 2025-03-05 DIAGNOSIS — R07.9 CHEST PAIN OF UNCERTAIN ETIOLOGY: ICD-10-CM

## 2025-03-05 PROCEDURE — 3044F HG A1C LEVEL LT 7.0%: CPT | Mod: CPTII,,, | Performed by: INTERNAL MEDICINE

## 2025-03-05 PROCEDURE — 99204 OFFICE O/P NEW MOD 45 MIN: CPT | Mod: S$PBB,,, | Performed by: INTERNAL MEDICINE

## 2025-03-05 PROCEDURE — 1159F MED LIST DOCD IN RCRD: CPT | Mod: CPTII,,, | Performed by: INTERNAL MEDICINE

## 2025-03-05 PROCEDURE — 3078F DIAST BP <80 MM HG: CPT | Mod: CPTII,,, | Performed by: INTERNAL MEDICINE

## 2025-03-05 PROCEDURE — 99213 OFFICE O/P EST LOW 20 MIN: CPT | Mod: PBBFAC | Performed by: INTERNAL MEDICINE

## 2025-03-05 PROCEDURE — 93010 ELECTROCARDIOGRAM REPORT: CPT | Mod: S$PBB,,, | Performed by: INTERNAL MEDICINE

## 2025-03-05 PROCEDURE — 3074F SYST BP LT 130 MM HG: CPT | Mod: CPTII,,, | Performed by: INTERNAL MEDICINE

## 2025-03-05 PROCEDURE — 99999 PR PBB SHADOW E&M-EST. PATIENT-LVL III: CPT | Mod: PBBFAC,,, | Performed by: INTERNAL MEDICINE

## 2025-03-05 PROCEDURE — 3008F BODY MASS INDEX DOCD: CPT | Mod: CPTII,,, | Performed by: INTERNAL MEDICINE

## 2025-03-05 PROCEDURE — 93005 ELECTROCARDIOGRAM TRACING: CPT | Mod: PBBFAC | Performed by: INTERNAL MEDICINE

## 2025-03-05 PROCEDURE — 1160F RVW MEDS BY RX/DR IN RCRD: CPT | Mod: CPTII,,, | Performed by: INTERNAL MEDICINE

## 2025-03-05 NOTE — ASSESSMENT & PLAN NOTE
Stress test ordered.  Baseline Electrocardiogram without ischemic ST changes or infarct pattern.    He had activity associated chest discomfort but his risk profile cardiovascular wise is quite low.

## 2025-03-05 NOTE — PROGRESS NOTES
[facility] Cardiology     Subjective:    Patient ID:  Gera Jha is a 55 y.o. male who presents for evaluation of Chest Pain    Review of patient's allergies indicates:   Allergen Reactions    First aid ointment w/pramoxin [[id number]-qnzpv-zwxcpkh-pramoxine]     Penicillins      He states that he had episode of chest pain while cutting his grass 2 weeks ago.  The symptoms lasted 4-5 minutes.  He describes it as epigastric discomfort.  He has had it in the past but not anytime recently.  The symptoms have not recurred.  He did not go to the emergency room.  He is a former cigarette smoker.  He has no history of hypertension or diabetes.  He has never had a cardiac workup.  He told Dr. Lewis about the complaint and a referral was made.    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  He has right knee problems that has limited his ability to go back to work currently.  He is single.  He is an active man generally.  He is not considered sedentary.  He takes no medications.  His most recent LDL was 65 mg% by labs 2025 February.    He states that his family history is with family members with heart problems.    Today's electrocardiogram reviewed and independently interpreted by myself confirms heart rate 73 sinus rhythm normal tracing.         Review of Systems   Constitutional: Negative for chills, decreased appetite, diaphoresis, fever, malaise/fatigue, night sweats, weight gain and weight loss.   HENT:  Negative for congestion, ear discharge, ear pain, hearing loss, hoarse voice, nosebleeds, odynophagia, sore throat, stridor and tinnitus.    Eyes:  Negative for blurred vision, discharge, double vision, pain, photophobia, redness, vision loss in left eye, vision loss in right eye, visual disturbance and visual halos.   Cardiovascular:  Positive for chest pain. Negative for claudication, cyanosis, dyspnea on exertion, irregular heartbeat,  leg swelling, near-syncope, orthopnea, palpitations, paroxysmal nocturnal dyspnea and syncope.   Respiratory:  Negative for cough, hemoptysis, shortness of breath, sleep disturbances due to breathing, snoring, sputum production and wheezing.    Endocrine: Negative for cold intolerance, heat intolerance, polydipsia, polyphagia and polyuria.   Hematologic/Lymphatic: Negative for adenopathy and bleeding problem. Does not bruise/bleed easily.   Skin:  Negative for color change, dry skin, flushing, itching, nail changes, poor wound healing, rash, skin cancer, suspicious lesions and unusual hair distribution.   Musculoskeletal:  Positive for joint pain. Negative for arthritis, back pain, falls, gout, joint swelling, muscle cramps, muscle weakness, myalgias, neck pain and stiffness.   Gastrointestinal:  Negative for bloating, abdominal pain, anorexia, change in bowel habit, bowel incontinence, constipation, diarrhea, dysphagia, excessive appetite, flatus, heartburn, hematemesis, hematochezia, hemorrhoids, jaundice, melena, nausea and vomiting.   Genitourinary:  Negative for bladder incontinence, decreased libido, dysuria, flank pain, frequency, genital sores, hematuria, hesitancy, incomplete emptying, nocturia and urgency.   Neurological:  Negative for aphonia, brief paralysis, difficulty with concentration, disturbances in coordination, excessive daytime sleepiness, dizziness, focal weakness, headaches, light-headedness, loss of balance, numbness, paresthesias, seizures, sensory change, tremors, vertigo and weakness.   Psychiatric/Behavioral:  Negative for altered mental status, depression, hallucinations, memory loss, substance abuse, suicidal ideas and thoughts of violence. The patient does not have insomnia and is not nervous/anxious.    Allergic/Immunologic: Negative for hives and persistent infections.        Objective:       Vitals:    03/05/25 1124   BP: 126/76   Patient Position: Sitting   Pulse: 76   Resp: 18  "  SpO2: 98%   Weight: 104.8 kg (231 lb)   Height: 6' 2" (1.88 m)    Physical Exam  Constitutional:       General: He is not in acute distress.     Appearance: He is well-developed. He is not diaphoretic.   HENT:      Head: Normocephalic and atraumatic.      Nose: Nose normal.   Eyes:      General: No scleral icterus.        Right eye: No discharge.      Conjunctiva/sclera: Conjunctivae normal.      Pupils: Pupils are equal, round, and reactive to light.   Neck:      Thyroid: No thyromegaly.      Vascular: No JVD.      Trachea: No tracheal deviation.   Cardiovascular:      Rate and Rhythm: Normal rate and regular rhythm.      Pulses:           Carotid pulses are 2+ on the right side and 2+ on the left side.       Radial pulses are 2+ on the right side and 2+ on the left side.        Dorsalis pedis pulses are 2+ on the right side and 2+ on the left side.        Posterior tibial pulses are 2+ on the right side and 2+ on the left side.      Heart sounds: Normal heart sounds. No murmur heard.     No friction rub. No gallop.   Pulmonary:      Effort: Pulmonary effort is normal. No respiratory distress.      Breath sounds: Normal breath sounds. No stridor. No wheezing or rales.   Chest:      Chest wall: No tenderness.   Abdominal:      General: Bowel sounds are normal. There is no distension.      Palpations: Abdomen is soft. There is no mass.      Tenderness: There is no abdominal tenderness. There is no guarding or rebound.   Musculoskeletal:         General: No tenderness. Normal range of motion.      Cervical back: Normal range of motion and neck supple.   Lymphadenopathy:      Cervical: No cervical adenopathy.   Skin:     General: Skin is warm and dry.      Coloration: Skin is not pale.      Findings: No erythema or rash.   Neurological:      Mental Status: He is alert and oriented to person, place, and time.      Cranial Nerves: No cranial nerve deficit.      Coordination: Coordination normal.   Psychiatric:         " Behavior: Behavior normal.         Thought Content: Thought content normal.         Judgment: Judgment normal.           Assessment:       1. Chest pain, unspecified type    2. Chest pain of uncertain etiology      Results for orders placed or performed in visit on 02/28/25   PSA, Screening    Collection Time: 02/28/25  8:10 AM   Result Value Ref Range    PSA, Screen 0.21 0.00 - 4.00 ng/mL   Lipid Panel    Collection Time: 02/28/25  8:10 AM   Result Value Ref Range    Cholesterol 127 120 - 199 mg/dL    Triglycerides 49 30 - 150 mg/dL    HDL 52 40 - 75 mg/dL    LDL Cholesterol 65.2 63.0 - 159.0 mg/dL    HDL/Cholesterol Ratio 40.9 20.0 - 50.0 %    Total Cholesterol/HDL Ratio 2.4 2.0 - 5.0    Non-HDL Cholesterol 75 mg/dL   Hemoglobin A1C    Collection Time: 02/28/25  8:10 AM   Result Value Ref Range    Hemoglobin A1C 5.5 4.0 - 5.6 %    Estimated Avg Glucose 111 68 - 131 mg/dL   Comprehensive Metabolic Panel    Collection Time: 02/28/25  8:10 AM   Result Value Ref Range    Sodium 142 136 - 145 mmol/L    Potassium 4.1 3.5 - 5.1 mmol/L    Chloride 108 95 - 110 mmol/L    CO2 26 23 - 29 mmol/L    Glucose 122 (H) 70 - 110 mg/dL    BUN 22 (H) 6 - 20 mg/dL    Creatinine 0.8 0.5 - 1.4 mg/dL    Calcium 8.9 8.7 - 10.5 mg/dL    Total Protein 7.3 6.0 - 8.4 g/dL    Albumin 3.7 3.5 - 5.2 g/dL    Total Bilirubin 1.5 (H) 0.1 - 1.0 mg/dL    Alkaline Phosphatase 57 40 - 150 U/L    AST 15 10 - 40 U/L    ALT 16 10 - 44 U/L    eGFR >60 >60 mL/min/1.73 m^2    Anion Gap 8 8 - 16 mmol/L   CBC Auto Differential    Collection Time: 02/28/25  8:10 AM   Result Value Ref Range    WBC 9.27 3.90 - 12.70 K/uL    RBC 4.65 4.60 - 6.20 M/uL    Hemoglobin 13.5 (L) 14.0 - 18.0 g/dL    Hematocrit 40.9 40.0 - 54.0 %    MCV 88 82 - 98 fL    MCH 29.0 27.0 - 31.0 pg    MCHC 33.0 32.0 - 36.0 g/dL    RDW 13.2 11.5 - 14.5 %    Platelets 200 150 - 450 K/uL    MPV 12.1 9.2 - 12.9 fL    Immature Granulocytes 0.2 0.0 - 0.5 %    Gran # (ANC) 6.4 1.8 - 7.7 K/uL     "Immature Grans (Abs) 0.02 0.00 - 0.04 K/uL    Lymph # 1.3 1.0 - 4.8 K/uL    Mono # 0.7 0.3 - 1.0 K/uL    Eos # 0.7 (H) 0.0 - 0.5 K/uL    Baso # 0.06 0.00 - 0.20 K/uL    nRBC 0 0 /100 WBC    Gran % 69.4 38.0 - 73.0 %    Lymph % 14.5 (L) 18.0 - 48.0 %    Mono % 7.4 4.0 - 15.0 %    Eosinophil % 7.9 0.0 - 8.0 %    Basophil % 0.6 0.0 - 1.9 %    Differential Method Automated        Current Medications[1]     Lab Results   Component Value Date    WBC 9.27 02/28/2025    RBC 4.65 02/28/2025    HGB 13.5 (L) 02/28/2025    HCT 40.9 02/28/2025    MCV 88 02/28/2025    MCH 29.0 02/28/2025    MCHC 33.0 02/28/2025    RDW 13.2 02/28/2025     02/28/2025    MPV 12.1 02/28/2025    GRAN 6.4 02/28/2025    GRAN 69.4 02/28/2025    LYMPH 1.3 02/28/2025    LYMPH 14.5 (L) 02/28/2025    MONO 0.7 02/28/2025    MONO 7.4 02/28/2025    EOS 0.7 (H) 02/28/2025    BASO 0.06 02/28/2025    EOSINOPHIL 7.9 02/28/2025    BASOPHIL 0.6 02/28/2025        CMP  Lab Results   Component Value Date     02/28/2025    K 4.1 02/28/2025     02/28/2025    CO2 26 02/28/2025     (H) 02/28/2025    BUN 22 (H) 02/28/2025    CREATININE 0.8 02/28/2025    CALCIUM 8.9 02/28/2025    PROT 7.3 02/28/2025    ALBUMIN 3.7 02/28/2025    BILITOT 1.5 (H) 02/28/2025    ALKPHOS 57 02/28/2025    AST 15 02/28/2025    ALT 16 02/28/2025    ANIONGAP 8 02/28/2025        No results found for: "LABBLOO", "LABURIN", "RESPIRATORYC", "GSRESP"         No results found for this or any previous visit.     X-Ray Chest 1 View 07/09/2024 [id number] Final   X-Ray Knee 1 or 2 View Left 01/19/2024 [id number] Final             Plan:       Problem List Items Addressed This Visit          Cardiac/Vascular    Chest pain of uncertain etiology    Stress test ordered.  Baseline Electrocardiogram without ischemic ST changes or infarct pattern.    He had activity associated chest discomfort but his risk profile cardiovascular wise is quite low.          Other Visit Diagnoses         Chest pain, " unspecified type    -  Primary    Relevant Orders    IN OFFICE EKG 12-LEAD (to Muse)    Exercise Stress - EKG               A walk treadmill is scheduled.    Much education carried out regarding his symptomatology and its possible connection to ischemic heart disease.    He has a low cardiovascular profile for ischemic heart disease.  Provided his stress test is normal I would not recommend further evaluation.  We discussed alternative triggers for his chest discomfort.    We discussed calcium scores, he is not interested in having one at this time.    Thank you for allowing me to participate in your patient's care.              Ron Brewster MD  03/05/2025   12:03 PM               [1] No current outpatient medications on file.

## 2025-03-06 LAB
OHS QRS DURATION: 94 MS
OHS QTC CALCULATION: 429 MS

## 2025-03-13 ENCOUNTER — HOSPITAL ENCOUNTER (OUTPATIENT)
Dept: PULMONOLOGY | Facility: HOSPITAL | Age: 55
Discharge: HOME | End: 2025-03-13
Attending: INTERNAL MEDICINE
Payer: MEDICAID

## 2025-03-13 ENCOUNTER — PATIENT MESSAGE (OUTPATIENT)
Dept: CARDIOLOGY | Facility: HOSPITAL | Age: 55
End: 2025-03-13
Payer: MEDICAID

## 2025-03-13 DIAGNOSIS — R07.9 CHEST PAIN, UNSPECIFIED TYPE: ICD-10-CM

## 2025-03-13 LAB
CV STRESS BASE HR: 86 BPM
DIASTOLIC BLOOD PRESSURE: 80 MMHG
OHS CV CPX 1 MINUTE RECOVERY HEART RATE: 131 BPM
OHS CV CPX 85 PERCENT MAX PREDICTED HEART RATE MALE: 141
OHS CV CPX ESTIMATED METS: 8
OHS CV CPX MAX PREDICTED HEART RATE: 166
OHS CV CPX PATIENT IS FEMALE: 0
OHS CV CPX PATIENT IS MALE: 1
OHS CV CPX PEAK DIASTOLIC BLOOD PRESSURE: 57 MMHG
OHS CV CPX PEAK HEAR RATE: 150 BPM
OHS CV CPX PEAK RATE PRESSURE PRODUCT: NORMAL
OHS CV CPX PEAK SYSTOLIC BLOOD PRESSURE: 159 MMHG
OHS CV CPX PERCENT MAX PREDICTED HEART RATE ACHIEVED: 90
OHS CV CPX RATE PRESSURE PRODUCT PRESENTING: NORMAL
STRESS ECHO POST EXERCISE DUR MIN: 6 MINUTES
STRESS ECHO POST EXERCISE DUR SEC: 28 SECONDS
SYSTOLIC BLOOD PRESSURE: 128 MMHG

## 2025-03-13 PROCEDURE — 93016 CV STRESS TEST SUPVJ ONLY: CPT | Mod: ,,, | Performed by: INTERNAL MEDICINE

## 2025-03-13 PROCEDURE — 93017 CV STRESS TEST TRACING ONLY: CPT

## 2025-03-13 PROCEDURE — 93018 CV STRESS TEST I&R ONLY: CPT | Mod: ,,, | Performed by: INTERNAL MEDICINE

## 2025-04-02 ENCOUNTER — TELEPHONE (OUTPATIENT)
Dept: ORTHOPEDICS | Facility: CLINIC | Age: 55
End: 2025-04-02
Payer: MEDICAID

## 2025-04-02 ENCOUNTER — OFFICE VISIT (OUTPATIENT)
Dept: CARDIOLOGY | Facility: CLINIC | Age: 55
End: 2025-04-02
Payer: MEDICAID

## 2025-04-02 VITALS
DIASTOLIC BLOOD PRESSURE: 72 MMHG | RESPIRATION RATE: 18 BRPM | BODY MASS INDEX: 29.26 KG/M2 | HEART RATE: 74 BPM | SYSTOLIC BLOOD PRESSURE: 124 MMHG | HEIGHT: 74 IN | WEIGHT: 228 LBS | OXYGEN SATURATION: 98 %

## 2025-04-02 DIAGNOSIS — R52 PAIN: Primary | ICD-10-CM

## 2025-04-02 DIAGNOSIS — R07.9 CHEST PAIN OF UNCERTAIN ETIOLOGY: Primary | ICD-10-CM

## 2025-04-02 PROCEDURE — 3074F SYST BP LT 130 MM HG: CPT | Mod: CPTII,,, | Performed by: INTERNAL MEDICINE

## 2025-04-02 PROCEDURE — 3044F HG A1C LEVEL LT 7.0%: CPT | Mod: CPTII,,, | Performed by: INTERNAL MEDICINE

## 2025-04-02 PROCEDURE — 99213 OFFICE O/P EST LOW 20 MIN: CPT | Mod: PBBFAC | Performed by: INTERNAL MEDICINE

## 2025-04-02 PROCEDURE — 99999 PR PBB SHADOW E&M-EST. PATIENT-LVL III: CPT | Mod: PBBFAC,,, | Performed by: INTERNAL MEDICINE

## 2025-04-02 PROCEDURE — 1159F MED LIST DOCD IN RCRD: CPT | Mod: CPTII,,, | Performed by: INTERNAL MEDICINE

## 2025-04-02 PROCEDURE — 99213 OFFICE O/P EST LOW 20 MIN: CPT | Mod: S$PBB,,, | Performed by: INTERNAL MEDICINE

## 2025-04-02 PROCEDURE — 3008F BODY MASS INDEX DOCD: CPT | Mod: CPTII,,, | Performed by: INTERNAL MEDICINE

## 2025-04-02 PROCEDURE — 1160F RVW MEDS BY RX/DR IN RCRD: CPT | Mod: CPTII,,, | Performed by: INTERNAL MEDICINE

## 2025-04-02 PROCEDURE — 3078F DIAST BP <80 MM HG: CPT | Mod: CPTII,,, | Performed by: INTERNAL MEDICINE

## 2025-04-02 RX ORDER — NITROGLYCERIN 0.4 MG/1
0.4 TABLET SUBLINGUAL EVERY 5 MIN PRN
Qty: 20 TABLET | Refills: 1 | Status: SHIPPED | OUTPATIENT
Start: 2025-04-02 | End: 2026-04-02

## 2025-04-02 NOTE — PROGRESS NOTES
[facility]     Subjective:    Patient ID:  Gera Jha is a 55 y.o. male who presents for follow-up of Chest Pain    Review of patient's allergies indicates:   Allergen Reactions    First aid ointment w/pramoxin [jlduh-gqrpa-vcenesg-pramoxine]     Penicillins      He underwent a walk treadmill non imaging stress test and did well.  He walked for nearly 7 minutes and there was no chest discomfort.  He has had activity associated chest discomfort intermittently.  He had a normal blood pressure response to exercise.  No ectopy noted.]    He states he has not had any significant episodes of chest discomfort but has had light episodes of chest discomfort intermittently.  His LDL is 65 mg% without medications.  His family history is negative for coronary disease.  He is a former smoker.  He has no history of hypertension.             Review of Systems   Constitutional: Negative for chills, decreased appetite, diaphoresis, fever, malaise/fatigue, night sweats, weight gain and weight loss.   HENT:  Negative for congestion, ear discharge, ear pain, hearing loss, hoarse voice, nosebleeds, odynophagia, sore throat, stridor and tinnitus.    Eyes:  Negative for blurred vision, discharge, double vision, pain, photophobia, redness, vision loss in left eye, vision loss in right eye, visual disturbance and visual halos.   Cardiovascular:  Positive for chest pain. Negative for claudication, cyanosis, dyspnea on exertion, irregular heartbeat, leg swelling, near-syncope, orthopnea, palpitations, paroxysmal nocturnal dyspnea and syncope.   Respiratory:  Negative for cough, hemoptysis, shortness of breath, sleep disturbances due to breathing, snoring, sputum production and wheezing.    Endocrine: Negative for cold intolerance, heat intolerance, polydipsia, polyphagia and polyuria.   Hematologic/Lymphatic: Negative for adenopathy and bleeding problem.  "Does not bruise/bleed easily.   Skin:  Negative for color change, dry skin, flushing, itching, nail changes, poor wound healing, rash, skin cancer, suspicious lesions and unusual hair distribution.   Musculoskeletal:  Negative for arthritis, back pain, falls, gout, joint pain, joint swelling, muscle cramps, muscle weakness, myalgias, neck pain and stiffness.   Gastrointestinal:  Negative for bloating, abdominal pain, anorexia, change in bowel habit, bowel incontinence, constipation, diarrhea, dysphagia, excessive appetite, flatus, heartburn, hematemesis, hematochezia, hemorrhoids, jaundice, melena, nausea and vomiting.   Genitourinary:  Negative for bladder incontinence, decreased libido, dysuria, flank pain, frequency, genital sores, hematuria, hesitancy, incomplete emptying, nocturia and urgency.   Neurological:  Negative for aphonia, brief paralysis, difficulty with concentration, disturbances in coordination, excessive daytime sleepiness, dizziness, focal weakness, headaches, light-headedness, loss of balance, numbness, paresthesias, seizures, sensory change, tremors, vertigo and weakness.   Psychiatric/Behavioral:  Negative for altered mental status, depression, hallucinations, memory loss, substance abuse, suicidal ideas and thoughts of violence. The patient does not have insomnia and is not nervous/anxious.    Allergic/Immunologic: Negative for hives and persistent infections.        Objective:       Vitals:    04/02/25 1041   BP: 124/72   Patient Position: Sitting   Pulse: 74   Resp: 18   SpO2: 98%   Weight: 103.4 kg (228 lb)   Height: 6' 2" (1.88 m)    Physical Exam  Constitutional:       General: He is not in acute distress.     Appearance: He is well-developed. He is not diaphoretic.   HENT:      Head: Normocephalic and atraumatic.      Nose: Nose normal.   Eyes:      General: No scleral icterus.        Right eye: No discharge.      Conjunctiva/sclera: Conjunctivae normal.      Pupils: Pupils are equal, " round, and reactive to light.   Neck:      Thyroid: No thyromegaly.      Vascular: No JVD.      Trachea: No tracheal deviation.   Cardiovascular:      Rate and Rhythm: Normal rate and regular rhythm.      Pulses:           Carotid pulses are 2+ on the right side and 2+ on the left side.       Radial pulses are 2+ on the right side and 2+ on the left side.        Dorsalis pedis pulses are 2+ on the right side and 2+ on the left side.        Posterior tibial pulses are 2+ on the right side and 2+ on the left side.      Heart sounds: Normal heart sounds. No murmur heard.     No friction rub. No gallop.   Pulmonary:      Effort: Pulmonary effort is normal. No respiratory distress.      Breath sounds: Normal breath sounds. No stridor. No wheezing or rales.   Chest:      Chest wall: No tenderness.   Abdominal:      General: Bowel sounds are normal. There is no distension.      Palpations: Abdomen is soft. There is no mass.      Tenderness: There is no abdominal tenderness. There is no guarding or rebound.   Musculoskeletal:         General: No tenderness. Normal range of motion.      Cervical back: Normal range of motion and neck supple.   Lymphadenopathy:      Cervical: No cervical adenopathy.   Skin:     General: Skin is warm and dry.      Coloration: Skin is not pale.      Findings: No erythema or rash.   Neurological:      Mental Status: He is alert and oriented to person, place, and time.      Cranial Nerves: No cranial nerve deficit.      Coordination: Coordination normal.   Psychiatric:         Behavior: Behavior normal.         Thought Content: Thought content normal.         Judgment: Judgment normal.           Assessment:       1. Chest pain of uncertain etiology      Results for orders placed or performed during the hospital encounter of 03/13/25   Exercise Stress - EKG    Collection Time: 03/13/25 10:36 AM   Result Value Ref Range    85% Max Predicted      Max Predicted      OHS CV CPX PATIENT IS  "MALE 1.0     OHS CV CPX PATIENT IS FEMALE 0.0     HR at rest 86 bpm    Systolic blood pressure 128 mmHg    Diastolic blood pressure 80 mmHg    RPP 11,008     Exercise duration (min) 6 minutes    Exercise duration (sec) 28 seconds    Peak  bpm    Peak Systolic  mmHg    Peak Diatolic BP 57 mmHg    Peak RPP 23,850     Estimated METs 8     % Max HR Achieved 90     1 Minute Recovery  bpm       Current Medications[1]     Lab Results   Component Value Date    WBC 9.27 02/28/2025    RBC 4.65 02/28/2025    HGB 13.5 (L) 02/28/2025    HCT 40.9 02/28/2025    MCV 88 02/28/2025    MCH 29.0 02/28/2025    MCHC 33.0 02/28/2025    RDW 13.2 02/28/2025     02/28/2025    MPV 12.1 02/28/2025    GRAN 6.4 02/28/2025    GRAN 69.4 02/28/2025    LYMPH 1.3 02/28/2025    LYMPH 14.5 (L) 02/28/2025    MONO 0.7 02/28/2025    MONO 7.4 02/28/2025    EOS 0.7 (H) 02/28/2025    BASO 0.06 02/28/2025    EOSINOPHIL 7.9 02/28/2025    BASOPHIL 0.6 02/28/2025        CMP  Lab Results   Component Value Date     02/28/2025    K 4.1 02/28/2025     02/28/2025    CO2 26 02/28/2025     (H) 02/28/2025    BUN 22 (H) 02/28/2025    CREATININE 0.8 02/28/2025    CALCIUM 8.9 02/28/2025    PROT 7.3 02/28/2025    ALBUMIN 3.7 02/28/2025    BILITOT 1.5 (H) 02/28/2025    ALKPHOS 57 02/28/2025    AST 15 02/28/2025    ALT 16 02/28/2025    ANIONGAP 8 02/28/2025        No results found for: "LABBLOO", "LABURIN", "RESPIRATORYC", "GSRESP"         Results for orders placed or performed in visit on 03/05/25   IN OFFICE EKG 12-LEAD (to [place])    Collection Time: 03/05/25 11:25 AM   Result Value Ref Range    QRS Duration 94 ms    [facility]HS QTC Calculation 429 ms    Narrative    Test Reason : R07.9,    Vent. Rate :  73 BPM     Atrial Rate :  73 BPM     P-R Int : 184 ms          QRS Dur :  94 ms      QT Int : 390 ms       P-R-T Axes :  56  37  17 degrees    QTcB Int : 429 ms    Normal sinus rhythm  Normal ECG  No previous ECGs available  Confirmed by " Ron Brewster ([id number]) on 3/6/2025 7:52:12 AM    Referred By: NNEKA AVILA           Confirmed By: Ron Brewster                   Plan:       Problem List Items Addressed This Visit          Cardiac/Vascular    Chest pain of uncertain etiology - Primary    Sublingual nitroglycerin prescribed.  His stress test was negative, non imaging exam.    I have low suspicion for angina but the because of continued activity associated chest discomforts further investigation warranted.         Relevant Medications    nitroGLYCERIN (NITROSTAT) 0.4 MG SL tablet            He did not want to go to [place] for a calcium score.  I told him that if his calcium score was 0 I would probably consider his symptoms noncardiac.    Nitroglycerin prescribed today.  I would like him to utilize as a challenge sublingual nitro.    Two month follow-up recommended.  Much discussion carried out regarding his stress test, symptoms and still the possibility of angina explaining chest pain.           Ron Brewster MD  04/02/2025   11:02 AM               [1]   Current Outpatient Medications:     nitroGLYCERIN (NITROSTAT) 0.4 MG SL tablet, Place 1 tablet (0.4 mg total) under the tongue every 5 (five) minutes as needed for Chest pain., Disp: 20 tablet, Rfl: 1

## 2025-04-02 NOTE — ASSESSMENT & PLAN NOTE
Sublingual nitroglycerin prescribed.  His stress test was negative, non imaging exam.    I have low suspicion for angina but the because of continued activity associated chest discomforts further investigation warranted.

## 2025-04-03 ENCOUNTER — HOSPITAL ENCOUNTER (OUTPATIENT)
Dept: RADIOLOGY | Facility: HOSPITAL | Age: 55
Discharge: HOME | End: 2025-04-03
Attending: PHYSICIAN ASSISTANT
Payer: MEDICAID

## 2025-04-03 ENCOUNTER — OFFICE VISIT (OUTPATIENT)
Dept: ORTHOPEDICS | Facility: CLINIC | Age: 55
End: 2025-04-03
Payer: MEDICAID

## 2025-04-03 VITALS
WEIGHT: 236.5 LBS | RESPIRATION RATE: 18 BRPM | BODY MASS INDEX: 30.35 KG/M2 | HEIGHT: 74 IN | OXYGEN SATURATION: 99 % | HEART RATE: 87 BPM

## 2025-04-03 DIAGNOSIS — G89.29 CHRONIC PAIN OF LEFT KNEE: ICD-10-CM

## 2025-04-03 DIAGNOSIS — M25.562 CHRONIC PAIN OF LEFT KNEE: ICD-10-CM

## 2025-04-03 DIAGNOSIS — R52 PAIN: ICD-10-CM

## 2025-04-03 DIAGNOSIS — M17.12 PRIMARY OSTEOARTHRITIS OF LEFT KNEE: Primary | ICD-10-CM

## 2025-04-03 PROCEDURE — 20610 DRAIN/INJ JOINT/BURSA W/O US: CPT | Mod: PBBFAC | Performed by: PHYSICIAN ASSISTANT

## 2025-04-03 PROCEDURE — 73562 X-RAY EXAM OF KNEE 3: CPT | Mod: TC,RT

## 2025-04-03 PROCEDURE — 99213 OFFICE O/P EST LOW 20 MIN: CPT | Mod: PBBFAC,25 | Performed by: PHYSICIAN ASSISTANT

## 2025-04-03 PROCEDURE — 99999 PR PBB SHADOW E&M-EST. PATIENT-LVL III: CPT | Mod: PBBFAC,,, | Performed by: PHYSICIAN ASSISTANT

## 2025-04-03 PROCEDURE — 99999PBSHW PR PBB SHADOW TECHNICAL ONLY FILED TO HB: Mod: PBBFAC,,,

## 2025-04-03 RX ORDER — TRIAMCINOLONE ACETONIDE 40 MG/ML
40 INJECTION, SUSPENSION INTRA-ARTICULAR; INTRAMUSCULAR ONCE
Status: COMPLETED | OUTPATIENT
Start: 2025-04-03 | End: 2025-04-03

## 2025-04-03 RX ORDER — DICLOFENAC SODIUM 75 MG/1
75 TABLET, DELAYED RELEASE ORAL 2 TIMES DAILY
COMMUNITY

## 2025-04-03 RX ADMIN — TRIAMCINOLONE ACETONIDE 40 MG: 40 INJECTION, SUSPENSION INTRA-ARTICULAR; INTRAMUSCULAR at 11:04

## 2025-04-03 NOTE — PROGRESS NOTES
Subjective:      Patient ID: Gera Jha is a 55 y.o. male.    Chief Complaint: Pain of the Left Knee    Review of patient's allergies indicates:   Allergen Reactions    First aid ointment w/pramoxin [gijgt-qkxer-qlgqmwc-pramoxine]     Penicillins       54 yo M presents to clinic with c/o left knee pain x years, worse recently.  Reports knee injury 10 years ago, twisted knee when getting out of crane, has had pain off and on since then.  Achy pain mostly to medial knee that is worse with walking/standing and improves some with rest and diclofenac (prescribed by PCP).  No swelling, redness, warmth.  No catching or locking but knee does give way at times while walking.  He has completed PT in the past with little improvement of symptoms.        Review of Systems   Constitutional: Negative for chills, diaphoresis and fever.   HENT:  Negative for congestion, ear discharge and ear pain.    Eyes:  Negative for blurred vision, discharge, double vision and pain.   Cardiovascular:  Negative for chest pain, claudication and cyanosis.   Respiratory:  Negative for cough, hemoptysis and shortness of breath.    Endocrine: Negative for cold intolerance and heat intolerance.   Skin:  Negative for color change, dry skin, itching and rash.   Musculoskeletal:  Positive for joint pain. Negative for arthritis, back pain, falls, gout, joint swelling, muscle weakness and neck pain.   Gastrointestinal:  Negative for abdominal pain and change in bowel habit.   Neurological:  Negative for brief paralysis, disturbances in coordination, dizziness, numbness and paresthesias.   Psychiatric/Behavioral:  Negative for altered mental status and depression.          Objective:          General    Constitutional: He is oriented to person, place, and time. He appears well-developed and well-nourished. No distress.   HENT:   Head: Atraumatic.   Eyes: EOM are normal. Right eye exhibits no discharge. Left eye exhibits no discharge.   Cardiovascular:   Normal rate.            Pulmonary/Chest: Effort normal. No respiratory distress.   Abdominal: Soft.   Neurological: He is alert and oriented to person, place, and time.   Psychiatric: He has a normal mood and affect. His behavior is normal.           Right Knee Exam     Inspection   Swelling: absent  Effusion: absent  Deformity: absent  Bruising: absent    Tenderness   The patient is experiencing no tenderness.     Crepitus   The patient has crepitus of the patella and medial joint line.    Range of Motion   The patient has normal right knee ROM.    Tests   Ligament Examination   MCL - Valgus: normal (0 to 2mm)  LCL - Varus: normal    Other   Sensation: normal    Left Knee Exam     Inspection   Swelling: absent  Effusion: absent  Deformity: absent  Bruising: absent    Tenderness   The patient tender to palpation of the medial joint line.    Crepitus   The patient has crepitus of the patella and medial joint line.    Range of Motion   Extension:  abnormal   Flexion:  abnormal     Tests   Stability   MCL - Valgus: normal (0 to 2mm)  LCL - Varus: normal (0 to 2mm)    Other   Sensation: normal    Vascular Exam       Edema  Right Lower Leg: absent  Left Lower Leg: absent              Assessment:         Xray Left Knee 4/3/25:  Right: Tricompartmental degenerative changes, most pronounced in the medial and patellofemoral compartment, with joint space narrowing, subchondral sclerosis and marginal osteophytosis.  There is dorsal spurring the patella.  No fracture or dislocation is seen.     Left: Tricompartmental degenerative changes, most pronounced in the medial patellofemoral compartment, with joint space narrowing, subchondral sclerosis and marginal osteophytosis.  Dorsal spurring from the patella.  Suspected loose body in the anterior inferior joint space measuring 1.5 cm.  Small joint effusion.      Encounter Diagnoses   Name Primary?    Chronic pain of left knee     Primary osteoarthritis of left knee Yes    Primary  osteoarthritis of left knee  -     Ambulatory referral/consult to Orthopedics  -     triamcinolone acetonide injection 40 mg    Chronic pain of left knee  -     Ambulatory referral/consult to Orthopedics  -     triamcinolone acetonide injection 40 mg               Plan:         The total face-to-face encounter time with this patient was 30 minutes and greater than 50% of of the encounter time was spent counseling the patient, coordinating care, and education regarding the pathology and natural history of his diagnosis. We have discussed a variety of treatment options including medications, injections, physical therapy and other alternative treatments. Pt is requesting CSI and home exercise program at this time.  Today, the patient chooses  a CSI and understands a minimum of 3 months time must lapse after injection, prior to a surgical procedure due to increased risk of infection.     I made the decision to obtain old records of the patient including previous notes and imaging. New imaging was ordered today of the extremity or extremities evaluated.        1. Injection Procedure  A time out was performed, including verification of patient ID, procedure, site and side, availability of information and equipment, review of safety issues, and agreement with consent, the procedure site was marked.    After time out was performed, the patient was prepped aseptically with chloraprep swabsticks. A diagnostic and therapeutic injection of 1:3cc Kenalog/Marcaine was given under sterile technique using a 22g x 1.5 needle from the Anterolateral aspect of the left Knee Joint in the sitting position.      Gera Jha had no adverse reactions to the medication. Pain decreased. He was instructed to apply ice to the joint for 20 minutes and avoid strenuous activities for 24-36 hours following the injection. He was warned of possible blood sugar and/or blood pressure changes during that time. Following that time, he can resume  regular activities.    He was reminded to call the clinic immediately for any adverse side effects as explained in clinic today.    2. Continue diclofenac as prescribed by PCP as needed.  3. Knee conditioning home exercise program. [facility] handout of exercises provided to patient.  4. Ice compress to the affected area 2-3x a day for 15-20 minutes as needed for pain management.  5. RTC in 6 weeks for follow-up, sooner if needed.    Patient voices understanding of and agreement with treatment plan. All of the patient's questions were answered and the patient will contact us if he has any questions or concerns in the interim.

## 2025-05-23 ENCOUNTER — OFFICE VISIT (OUTPATIENT)
Dept: ORTHOPEDICS | Facility: CLINIC | Age: 55
End: 2025-05-23
Payer: MEDICAID

## 2025-05-23 VITALS
OXYGEN SATURATION: 99 % | HEIGHT: 74 IN | HEART RATE: 71 BPM | DIASTOLIC BLOOD PRESSURE: 82 MMHG | BODY MASS INDEX: 31.44 KG/M2 | SYSTOLIC BLOOD PRESSURE: 130 MMHG | RESPIRATION RATE: 16 BRPM | WEIGHT: 245 LBS

## 2025-05-23 DIAGNOSIS — G89.29 CHRONIC PAIN OF LEFT KNEE: ICD-10-CM

## 2025-05-23 DIAGNOSIS — M25.562 CHRONIC PAIN OF LEFT KNEE: ICD-10-CM

## 2025-05-23 DIAGNOSIS — M17.12 PRIMARY OSTEOARTHRITIS OF LEFT KNEE: Primary | ICD-10-CM

## 2025-05-23 PROCEDURE — 99999 PR PBB SHADOW E&M-EST. PATIENT-LVL III: CPT | Mod: PBBFAC,,, | Performed by: PHYSICIAN ASSISTANT

## 2025-05-23 PROCEDURE — 99213 OFFICE O/P EST LOW 20 MIN: CPT | Mod: PBBFAC | Performed by: PHYSICIAN ASSISTANT

## 2025-05-23 NOTE — PROGRESS NOTES
Subjective:      Patient ID: Gera Jha is a 55 y.o. male.    Chief Complaint: Pain of the Left Knee    Review of patient's allergies indicates:   Allergen Reactions    First aid ointment w/pramoxin [aasua-njdop-mpbysmd-pramoxine]     Penicillins       Pt returns to clinic for follow up of left knee pain.  He reports some relief of pain following CSI at last visit but only lasted a few days.    4/3/25:  54 yo M presents to clinic with c/o left knee pain x years, worse recently.  Reports knee injury 10 years ago, twisted knee when getting out of crane, has had pain off and on since then.  Achy pain mostly to medial knee that is worse with walking/standing and improves some with rest and diclofenac (prescribed by PCP).  No swelling, redness, warmth.  No catching or locking but knee does give way at times while walking.  He has completed PT in the past with little improvement of symptoms.        Review of Systems   Constitutional: Negative for chills, diaphoresis and fever.   HENT:  Negative for congestion, ear discharge and ear pain.    Eyes:  Negative for blurred vision, discharge, double vision and pain.   Cardiovascular:  Negative for chest pain, claudication and cyanosis.   Respiratory:  Negative for cough, hemoptysis and shortness of breath.    Endocrine: Negative for cold intolerance and heat intolerance.   Skin:  Negative for color change, dry skin, itching and rash.   Musculoskeletal:  Positive for joint pain. Negative for arthritis, back pain, falls, gout, joint swelling, muscle weakness and neck pain.   Gastrointestinal:  Negative for abdominal pain and change in bowel habit.   Neurological:  Negative for brief paralysis, disturbances in coordination, dizziness, numbness and paresthesias.   Psychiatric/Behavioral:  Negative for altered mental status and depression.          Objective:          General    Constitutional: He is oriented to person, place, and time. He appears well-developed and  well-nourished. No distress.   HENT:   Head: Atraumatic.   Eyes: EOM are normal. Right eye exhibits no discharge. Left eye exhibits no discharge.   Cardiovascular:  Normal rate.            Pulmonary/Chest: Effort normal. No respiratory distress.   Abdominal: Soft.   Neurological: He is alert and oriented to person, place, and time.   Psychiatric: He has a normal mood and affect. His behavior is normal.           Right Knee Exam     Inspection   Swelling: absent  Effusion: absent  Deformity: absent  Bruising: absent    Tenderness   The patient is experiencing no tenderness.     Crepitus   The patient has crepitus of the patella and medial joint line.    Range of Motion   The patient has normal right knee ROM.    Tests   Ligament Examination   MCL - Valgus: normal (0 to 2mm)  LCL - Varus: normal    Other   Sensation: normal    Left Knee Exam     Inspection   Swelling: absent  Effusion: absent  Deformity: absent  Bruising: absent    Tenderness   The patient tender to palpation of the medial joint line.    Crepitus   The patient has crepitus of the patella and medial joint line.    Range of Motion   Extension:  abnormal   Flexion:  abnormal     Tests   Stability   MCL - Valgus: normal (0 to 2mm)  LCL - Varus: normal (0 to 2mm)    Other   Sensation: normal    Vascular Exam       Edema  Right Lower Leg: absent  Left Lower Leg: absent              Assessment:         Xray Left Knee 4/3/25:  Right: Tricompartmental degenerative changes, most pronounced in the medial and patellofemoral compartment, with joint space narrowing, subchondral sclerosis and marginal osteophytosis.  There is dorsal spurring the patella.  No fracture or dislocation is seen.     Left: Tricompartmental degenerative changes, most pronounced in the medial patellofemoral compartment, with joint space narrowing, subchondral sclerosis and marginal osteophytosis.  Dorsal spurring from the patella.  Suspected loose body in the anterior inferior joint space  measuring 1.5 cm.  Small joint effusion.      Encounter Diagnoses   Name Primary?    Primary osteoarthritis of left knee Yes    Chronic pain of left knee       Primary osteoarthritis of left knee    Chronic pain of left knee                 Plan:         The total face-to-face encounter time with this patient was 30 minutes and greater than 50% of of the encounter time was spent counseling the patient, coordinating care, and education regarding the pathology and natural history of his diagnosis. We have discussed a variety of treatment options including medications, injections, physical therapy and other alternative treatments. Pt is interested in surgery, requesting referral to surgeon.    I made the decision to obtain old records of the patient including previous notes and imaging. New imaging was ordered today of the extremity or extremities evaluated.        Referral to joint replacement surgeon. Pt requesting to be seen at [facility].  2. Continue diclofenac as prescribed by PCP as needed.  3. Knee conditioning home exercise program. [facility] handout of exercises provided to patient.  4. Ice compress to the affected area 2-3x a day for 15-20 minutes as needed for pain management.  5. RTC as needed.    Patient voices understanding of and agreement with treatment plan. All of the patient's questions were answered and the patient will contact us if he has any questions or concerns in the interim.

## 2025-06-02 PROBLEM — M17.12 PRIMARY OSTEOARTHRITIS OF LEFT KNEE: Status: ACTIVE | Noted: 2025-06-02

## 2025-06-03 ENCOUNTER — OFFICE VISIT (OUTPATIENT)
Dept: CARDIOLOGY | Facility: CLINIC | Age: 55
End: 2025-06-03
Payer: MEDICAID

## 2025-06-03 VITALS
RESPIRATION RATE: 18 BRPM | DIASTOLIC BLOOD PRESSURE: 80 MMHG | SYSTOLIC BLOOD PRESSURE: 142 MMHG | HEART RATE: 76 BPM | WEIGHT: 244.06 LBS | HEIGHT: 74 IN | OXYGEN SATURATION: 98 % | BODY MASS INDEX: 31.32 KG/M2

## 2025-06-03 DIAGNOSIS — R07.9 CHEST PAIN, UNSPECIFIED TYPE: Primary | ICD-10-CM

## 2025-06-03 DIAGNOSIS — Z01.818 PREOPERATIVE CLEARANCE: ICD-10-CM

## 2025-06-03 DIAGNOSIS — R07.9 CHEST PAIN OF UNCERTAIN ETIOLOGY: ICD-10-CM

## 2025-06-03 PROCEDURE — 1159F MED LIST DOCD IN RCRD: CPT | Mod: CPTII,,, | Performed by: INTERNAL MEDICINE

## 2025-06-03 PROCEDURE — 99999 PR PBB SHADOW E&M-EST. PATIENT-LVL III: CPT | Mod: PBBFAC,,, | Performed by: INTERNAL MEDICINE

## 2025-06-03 PROCEDURE — 3044F HG A1C LEVEL LT 7.0%: CPT | Mod: CPTII,,, | Performed by: INTERNAL MEDICINE

## 2025-06-03 PROCEDURE — 3077F SYST BP >= 140 MM HG: CPT | Mod: CPTII,,, | Performed by: INTERNAL MEDICINE

## 2025-06-03 PROCEDURE — 3079F DIAST BP 80-89 MM HG: CPT | Mod: CPTII,,, | Performed by: INTERNAL MEDICINE

## 2025-06-03 PROCEDURE — 3008F BODY MASS INDEX DOCD: CPT | Mod: CPTII,,, | Performed by: INTERNAL MEDICINE

## 2025-06-03 PROCEDURE — 99213 OFFICE O/P EST LOW 20 MIN: CPT | Mod: PBBFAC | Performed by: INTERNAL MEDICINE

## 2025-06-03 PROCEDURE — 99213 OFFICE O/P EST LOW 20 MIN: CPT | Mod: S$PBB,,, | Performed by: INTERNAL MEDICINE

## 2025-06-03 PROCEDURE — 1160F RVW MEDS BY RX/DR IN RCRD: CPT | Mod: CPTII,,, | Performed by: INTERNAL MEDICINE

## 2025-06-05 ENCOUNTER — OFFICE VISIT (OUTPATIENT)
Dept: FAMILY MEDICINE | Facility: CLINIC | Age: 55
End: 2025-06-05
Payer: MEDICAID

## 2025-06-05 ENCOUNTER — HOSPITAL ENCOUNTER (OUTPATIENT)
Dept: RADIOLOGY | Facility: HOSPITAL | Age: 55
Discharge: HOME | End: 2025-06-05
Attending: STUDENT IN AN ORGANIZED HEALTH CARE EDUCATION/TRAINING PROGRAM
Payer: MEDICAID

## 2025-06-05 VITALS
OXYGEN SATURATION: 97 % | HEIGHT: 74 IN | RESPIRATION RATE: 14 BRPM | WEIGHT: 241.38 LBS | DIASTOLIC BLOOD PRESSURE: 70 MMHG | BODY MASS INDEX: 30.98 KG/M2 | HEART RATE: 77 BPM | SYSTOLIC BLOOD PRESSURE: 122 MMHG

## 2025-06-05 DIAGNOSIS — Z01.818 PRE-OP EVALUATION: ICD-10-CM

## 2025-06-05 DIAGNOSIS — Z01.818 PRE-OP EVALUATION: Primary | ICD-10-CM

## 2025-06-05 PROCEDURE — 71046 X-RAY EXAM CHEST 2 VIEWS: CPT | Mod: TC

## 2025-06-05 PROCEDURE — 3044F HG A1C LEVEL LT 7.0%: CPT | Mod: CPTII,,, | Performed by: STUDENT IN AN ORGANIZED HEALTH CARE EDUCATION/TRAINING PROGRAM

## 2025-06-05 PROCEDURE — 3074F SYST BP LT 130 MM HG: CPT | Mod: CPTII,,, | Performed by: STUDENT IN AN ORGANIZED HEALTH CARE EDUCATION/TRAINING PROGRAM

## 2025-06-05 PROCEDURE — 71046 X-RAY EXAM CHEST 2 VIEWS: CPT | Mod: 26,,, | Performed by: RADIOLOGY

## 2025-06-05 PROCEDURE — 3078F DIAST BP <80 MM HG: CPT | Mod: CPTII,,, | Performed by: STUDENT IN AN ORGANIZED HEALTH CARE EDUCATION/TRAINING PROGRAM

## 2025-06-05 PROCEDURE — 99999 PR PBB SHADOW E&M-EST. PATIENT-LVL III: CPT | Mod: PBBFAC,,, | Performed by: STUDENT IN AN ORGANIZED HEALTH CARE EDUCATION/TRAINING PROGRAM

## 2025-06-05 PROCEDURE — 1159F MED LIST DOCD IN RCRD: CPT | Mod: CPTII,,, | Performed by: STUDENT IN AN ORGANIZED HEALTH CARE EDUCATION/TRAINING PROGRAM

## 2025-06-05 PROCEDURE — 99213 OFFICE O/P EST LOW 20 MIN: CPT | Mod: PBBFAC,25 | Performed by: STUDENT IN AN ORGANIZED HEALTH CARE EDUCATION/TRAINING PROGRAM

## 2025-06-05 PROCEDURE — 3008F BODY MASS INDEX DOCD: CPT | Mod: CPTII,,, | Performed by: STUDENT IN AN ORGANIZED HEALTH CARE EDUCATION/TRAINING PROGRAM

## 2025-06-05 PROCEDURE — 99213 OFFICE O/P EST LOW 20 MIN: CPT | Mod: S$PBB,,, | Performed by: STUDENT IN AN ORGANIZED HEALTH CARE EDUCATION/TRAINING PROGRAM

## 2025-06-06 ENCOUNTER — RESULTS FOLLOW-UP (OUTPATIENT)
Dept: FAMILY MEDICINE | Facility: CLINIC | Age: 55
End: 2025-06-06

## 2025-06-06 NOTE — PROGRESS NOTES
Please inform the pt recent labs ok, no change to current treatment plan. He is medically optimized for surgery pending cardiac eval.

## 2025-06-25 ENCOUNTER — HOSPITAL ENCOUNTER (OUTPATIENT)
Dept: RADIOLOGY | Facility: HOSPITAL | Age: 55
Discharge: HOME | End: 2025-06-25
Attending: INTERNAL MEDICINE
Payer: MEDICAID

## 2025-06-25 ENCOUNTER — HOSPITAL ENCOUNTER (OUTPATIENT)
Dept: PULMONOLOGY | Facility: HOSPITAL | Age: 55
Discharge: HOME | End: 2025-06-25
Attending: INTERNAL MEDICINE
Payer: MEDICAID

## 2025-06-25 DIAGNOSIS — Z01.818 PREOPERATIVE CLEARANCE: ICD-10-CM

## 2025-06-25 DIAGNOSIS — R07.9 CHEST PAIN, UNSPECIFIED TYPE: ICD-10-CM

## 2025-06-25 LAB
CV STRESS BASE HR: 74 BPM
DIASTOLIC BLOOD PRESSURE: 75 MMHG
OHS CV CPX 1 MINUTE RECOVERY HEART RATE: 88 BPM
OHS CV CPX 85 PERCENT MAX PREDICTED HEART RATE MALE: 141
OHS CV CPX ESTIMATED METS: 1
OHS CV CPX MAX PREDICTED HEART RATE: 166
OHS CV CPX PATIENT IS FEMALE: 0
OHS CV CPX PATIENT IS MALE: 1
OHS CV CPX PEAK DIASTOLIC BLOOD PRESSURE: 62 MMHG
OHS CV CPX PEAK HEAR RATE: 95 BPM
OHS CV CPX PEAK RATE PRESSURE PRODUCT: NORMAL
OHS CV CPX PEAK SYSTOLIC BLOOD PRESSURE: 134 MMHG
OHS CV CPX PERCENT MAX PREDICTED HEART RATE ACHIEVED: 57
OHS CV CPX RATE PRESSURE PRODUCT PRESENTING: NORMAL
STRESS ECHO POST EXERCISE DUR MIN: 2 MINUTES
STRESS ECHO POST EXERCISE DUR SEC: 1 SECONDS
SYSTOLIC BLOOD PRESSURE: 146 MMHG

## 2025-06-25 PROCEDURE — 78452 HT MUSCLE IMAGE SPECT MULT: CPT | Mod: 26,,, | Performed by: NUCLEAR MEDICINE

## 2025-06-25 PROCEDURE — 93017 CV STRESS TEST TRACING ONLY: CPT

## 2025-06-25 PROCEDURE — 93016 CV STRESS TEST SUPVJ ONLY: CPT | Mod: ,,, | Performed by: INTERNAL MEDICINE

## 2025-06-25 PROCEDURE — 93018 CV STRESS TEST I&R ONLY: CPT | Mod: ,,, | Performed by: INTERNAL MEDICINE

## 2025-06-25 PROCEDURE — 78452 HT MUSCLE IMAGE SPECT MULT: CPT | Mod: TC

## 2025-06-25 PROCEDURE — A9500 TC99M SESTAMIBI: HCPCS | Performed by: INTERNAL MEDICINE

## 2025-06-25 PROCEDURE — 63600175 PHARM REV CODE 636 W HCPCS: Performed by: INTERNAL MEDICINE

## 2025-06-25 RX ORDER — REGADENOSON 0.08 MG/ML
0.4 INJECTION, SOLUTION INTRAVENOUS ONCE
Status: COMPLETED | OUTPATIENT
Start: 2025-06-25 | End: 2025-06-25

## 2025-06-25 RX ORDER — TETRAKIS(2-METHOXYISOBUTYLISOCYANIDE)COPPER(I) TETRAFLUOROBORATE 1 MG/ML
30 INJECTION, POWDER, LYOPHILIZED, FOR SOLUTION INTRAVENOUS
Status: DISCONTINUED | OUTPATIENT
Start: 2025-06-25 | End: 2025-06-26 | Stop reason: HOSPADM

## 2025-06-25 RX ORDER — TETRAKIS(2-METHOXYISOBUTYLISOCYANIDE)COPPER(I) TETRAFLUOROBORATE 1 MG/ML
10 INJECTION, POWDER, LYOPHILIZED, FOR SOLUTION INTRAVENOUS
Status: COMPLETED | OUTPATIENT
Start: 2025-06-25 | End: 2025-06-25

## 2025-06-25 RX ADMIN — REGADENOSON 0.4 MG: 0.08 INJECTION, SOLUTION INTRAVENOUS at 01:06

## 2025-06-25 RX ADMIN — KIT FOR THE PREPARATION OF TECHNETIUM TC99M SESTAMIBI 10 MILLICURIE: 1 INJECTION, POWDER, LYOPHILIZED, FOR SOLUTION PARENTERAL at 02:06

## 2025-07-07 ENCOUNTER — TELEPHONE (OUTPATIENT)
Dept: INTERNAL MEDICINE | Facility: CLINIC | Age: 55
End: 2025-07-07
Payer: MEDICAID

## 2025-07-07 ENCOUNTER — TELEPHONE (OUTPATIENT)
Dept: CARDIOLOGY | Facility: CLINIC | Age: 55
End: 2025-07-07
Payer: MEDICAID

## 2025-07-07 ENCOUNTER — RESULTS FOLLOW-UP (OUTPATIENT)
Dept: CARDIOLOGY | Facility: CLINIC | Age: 55
End: 2025-07-07

## 2025-07-07 NOTE — TELEPHONE ENCOUNTER
I spoke to the patient today regarding cardiac clearance for knee surgery.  His stress test does not show active ischemia.  We discussed options, he is without any chest pain complaints.  I will clear him for surgery based on his stress test results.      He will bring clearance papers, his knee surgery is at [facility]

## 2025-07-07 NOTE — TELEPHONE ENCOUNTER
----- Message from Blair sent at 7/3/2025  2:38 PM CDT -----  Contact: pt  Gera Jha  MRN: [id number]  : 1970  PCP: Philip Lewis  Home Phone      [phone]  Work Phone      [phone]  Mobile          [phone]      MESSAGE:     Pt called to schedule appt for surgery clearance. Pts will be scheduled once pt has clearance appt.          Please advise  [phone]

## 2025-07-08 ENCOUNTER — OFFICE VISIT (OUTPATIENT)
Dept: FAMILY MEDICINE | Facility: CLINIC | Age: 55
End: 2025-07-08
Payer: MEDICAID

## 2025-07-08 VITALS
RESPIRATION RATE: 14 BRPM | OXYGEN SATURATION: 98 % | HEIGHT: 74 IN | SYSTOLIC BLOOD PRESSURE: 130 MMHG | HEART RATE: 77 BPM | WEIGHT: 261.13 LBS | DIASTOLIC BLOOD PRESSURE: 72 MMHG | BODY MASS INDEX: 33.51 KG/M2

## 2025-07-08 DIAGNOSIS — Z01.818 PRE-OP EVALUATION: Primary | ICD-10-CM

## 2025-07-08 PROCEDURE — 3008F BODY MASS INDEX DOCD: CPT | Mod: CPTII,,, | Performed by: STUDENT IN AN ORGANIZED HEALTH CARE EDUCATION/TRAINING PROGRAM

## 2025-07-08 PROCEDURE — 3044F HG A1C LEVEL LT 7.0%: CPT | Mod: CPTII,,, | Performed by: STUDENT IN AN ORGANIZED HEALTH CARE EDUCATION/TRAINING PROGRAM

## 2025-07-08 PROCEDURE — 3078F DIAST BP <80 MM HG: CPT | Mod: CPTII,,, | Performed by: STUDENT IN AN ORGANIZED HEALTH CARE EDUCATION/TRAINING PROGRAM

## 2025-07-08 PROCEDURE — 3075F SYST BP GE 130 - 139MM HG: CPT | Mod: CPTII,,, | Performed by: STUDENT IN AN ORGANIZED HEALTH CARE EDUCATION/TRAINING PROGRAM

## 2025-07-08 PROCEDURE — 1159F MED LIST DOCD IN RCRD: CPT | Mod: CPTII,,, | Performed by: STUDENT IN AN ORGANIZED HEALTH CARE EDUCATION/TRAINING PROGRAM

## 2025-07-08 PROCEDURE — 1160F RVW MEDS BY RX/DR IN RCRD: CPT | Mod: CPTII,,, | Performed by: STUDENT IN AN ORGANIZED HEALTH CARE EDUCATION/TRAINING PROGRAM

## 2025-07-08 PROCEDURE — 99999 PR PBB SHADOW E&M-EST. PATIENT-LVL III: CPT | Mod: PBBFAC,,, | Performed by: STUDENT IN AN ORGANIZED HEALTH CARE EDUCATION/TRAINING PROGRAM

## 2025-07-08 PROCEDURE — 99213 OFFICE O/P EST LOW 20 MIN: CPT | Mod: PBBFAC | Performed by: STUDENT IN AN ORGANIZED HEALTH CARE EDUCATION/TRAINING PROGRAM

## 2025-07-08 PROCEDURE — 99213 OFFICE O/P EST LOW 20 MIN: CPT | Mod: S$PBB,,, | Performed by: STUDENT IN AN ORGANIZED HEALTH CARE EDUCATION/TRAINING PROGRAM

## 2025-07-08 NOTE — PROGRESS NOTES
Subjective:       Patient ID: Gera Jha is a 55 y.o. male.    Chief Complaint: surgery clearance (Patient is here today for surgery clearance. Patient is having left knee replacement. )    Pt here for pre-op evaluation. He is scheduled to have left TKA.     He has never had an adverse reaction to anesthesia in the past, had c-scope.    He reports he is in his usual state of health and has no other complaints today.    Review of Systems   Constitutional:  Negative for chills and fever.   HENT:  Negative for congestion and sore throat.    Respiratory:  Negative for cough and shortness of breath.    Cardiovascular:  Negative for chest pain.   Gastrointestinal:  Negative for abdominal pain, diarrhea, nausea and vomiting.   Genitourinary:  Negative for dysuria and hematuria.   Musculoskeletal:  Positive for arthralgias. Negative for joint swelling.   Neurological:  Negative for dizziness, syncope and light-headedness.       Objective:      Physical Exam  Vitals reviewed.   Constitutional:       General: He is not in acute distress.  HENT:      Head: Normocephalic and atraumatic.   Eyes:      Conjunctiva/sclera: Conjunctivae normal.   Cardiovascular:      Rate and Rhythm: Normal rate and regular rhythm.      Heart sounds: Normal heart sounds. No murmur heard.  Pulmonary:      Effort: No respiratory distress.      Breath sounds: Normal breath sounds.   Musculoskeletal:      Cervical back: Neck supple.   Neurological:      General: No focal deficit present.      Mental Status: He is alert and oriented to person, place, and time.   Psychiatric:         Mood and Affect: Mood normal.         Behavior: Behavior normal.         Assessment:       1. Pre-op evaluation        Plan:           1. Pre-op evaluation         Recent labs reviewed; he actually saw me last month and had pre-op workup then  He has been cleared by his cardiologist, Dr. Brewster per pt    Pt is medically optimized for upcoming surgery    RTC for  worsening symptoms or failure to improve, or RTC PRN/as scheduled

## 2025-08-27 ENCOUNTER — OFFICE VISIT (OUTPATIENT)
Dept: FAMILY MEDICINE | Facility: CLINIC | Age: 55
End: 2025-08-27
Payer: MEDICAID

## 2025-08-27 VITALS
RESPIRATION RATE: 16 BRPM | OXYGEN SATURATION: 97 % | SYSTOLIC BLOOD PRESSURE: 124 MMHG | WEIGHT: 268.88 LBS | HEIGHT: 74 IN | DIASTOLIC BLOOD PRESSURE: 70 MMHG | HEART RATE: 87 BPM | BODY MASS INDEX: 34.51 KG/M2

## 2025-08-27 DIAGNOSIS — G89.29 CHRONIC PAIN OF LEFT KNEE: Primary | ICD-10-CM

## 2025-08-27 DIAGNOSIS — M25.562 CHRONIC PAIN OF LEFT KNEE: Primary | ICD-10-CM

## 2025-08-27 DIAGNOSIS — E66.811 CLASS 1 OBESITY WITHOUT SERIOUS COMORBIDITY WITH BODY MASS INDEX (BMI) OF 30.0 TO 30.9 IN ADULT, UNSPECIFIED OBESITY TYPE: ICD-10-CM

## 2025-08-27 PROCEDURE — 3074F SYST BP LT 130 MM HG: CPT | Mod: CPTII,,, | Performed by: STUDENT IN AN ORGANIZED HEALTH CARE EDUCATION/TRAINING PROGRAM

## 2025-08-27 PROCEDURE — 99999 PR PBB SHADOW E&M-EST. PATIENT-LVL III: CPT | Mod: PBBFAC,,, | Performed by: STUDENT IN AN ORGANIZED HEALTH CARE EDUCATION/TRAINING PROGRAM

## 2025-08-27 PROCEDURE — 3044F HG A1C LEVEL LT 7.0%: CPT | Mod: CPTII,,, | Performed by: STUDENT IN AN ORGANIZED HEALTH CARE EDUCATION/TRAINING PROGRAM

## 2025-08-27 PROCEDURE — 99213 OFFICE O/P EST LOW 20 MIN: CPT | Mod: S$PBB,,, | Performed by: STUDENT IN AN ORGANIZED HEALTH CARE EDUCATION/TRAINING PROGRAM

## 2025-08-27 PROCEDURE — 99213 OFFICE O/P EST LOW 20 MIN: CPT | Mod: PBBFAC | Performed by: STUDENT IN AN ORGANIZED HEALTH CARE EDUCATION/TRAINING PROGRAM

## 2025-08-27 PROCEDURE — 3078F DIAST BP <80 MM HG: CPT | Mod: CPTII,,, | Performed by: STUDENT IN AN ORGANIZED HEALTH CARE EDUCATION/TRAINING PROGRAM

## 2025-08-27 PROCEDURE — 3008F BODY MASS INDEX DOCD: CPT | Mod: CPTII,,, | Performed by: STUDENT IN AN ORGANIZED HEALTH CARE EDUCATION/TRAINING PROGRAM
